# Patient Record
Sex: FEMALE | Race: WHITE | NOT HISPANIC OR LATINO | Employment: FULL TIME | URBAN - METROPOLITAN AREA
[De-identification: names, ages, dates, MRNs, and addresses within clinical notes are randomized per-mention and may not be internally consistent; named-entity substitution may affect disease eponyms.]

---

## 2017-01-06 ENCOUNTER — ONCOLOGY VISIT (OUTPATIENT)
Dept: ONCOLOGY | Facility: CLINIC | Age: 47
End: 2017-01-06
Attending: INTERNAL MEDICINE
Payer: COMMERCIAL

## 2017-01-06 VITALS
BODY MASS INDEX: 26.58 KG/M2 | OXYGEN SATURATION: 98 % | WEIGHT: 150 LBS | RESPIRATION RATE: 16 BRPM | SYSTOLIC BLOOD PRESSURE: 111 MMHG | HEART RATE: 65 BPM | DIASTOLIC BLOOD PRESSURE: 75 MMHG | TEMPERATURE: 98 F | HEIGHT: 63 IN

## 2017-01-06 DIAGNOSIS — C50.911 MALIGNANT NEOPLASM OF RIGHT FEMALE BREAST, UNSPECIFIED SITE OF BREAST: Primary | ICD-10-CM

## 2017-01-06 PROCEDURE — 99214 OFFICE O/P EST MOD 30 MIN: CPT | Performed by: INTERNAL MEDICINE

## 2017-01-06 PROCEDURE — 99211 OFF/OP EST MAY X REQ PHY/QHP: CPT

## 2017-01-06 ASSESSMENT — PAIN SCALES - GENERAL: PAINLEVEL: NO PAIN (0)

## 2017-01-06 NOTE — PROGRESS NOTES
"Malu Benavides is a 46 year old female who presents for:  Chief Complaint   Patient presents with     Oncology Clinic Visit     return breast CA F/U        Initial Vitals:  There were no vitals taken for this visit. Estimated body mass index is 26.76 kg/(m^2) as calculated from the following:    Height as of 4/11/16: 1.6 m (5' 3\").    Weight as of 10/7/16: 68.493 kg (151 lb).. There is no height or weight on file to calculate BSA. BP completed using cuff size: regular  Data Unavailable No LMP recorded. Patient is not currently having periods (Reason: UNKNOWN). Allergies and medications reviewed.     Medications: Medication refills not needed today.  Pharmacy name entered into [employer]:    [facility] [address] [place], [place] - [address] [facility] [place] [place] - [address] AT PORTAL TO REGISTERED [facility] SITES  [facility] [address] [place], [place] - [address] [place], [place] - [address]    Comments: none    8 minutes for nursing intake (face to face time)   Ana Griffith MA    DISCHARGE PLAN:Patient to scheduled  for breast MRI and follow up with Dr. Aguilar in 6 months    Next appointments: See patient instruction section  Departure Mode: Ambulatory  Accompanied by: self  5 minutes for nursing discharge (face to face time)   Purvi Neil RN        "

## 2017-01-06 NOTE — PROGRESS NOTES
[facility]    Hematology/Oncology Established Patient Follow-up Note      Today's Date: 01/06/2017    Reason for Follow-up:  right breast [id number] ER-/AK-, Her 2 + IDC, s/p lumpectomy s/p adjuvant TCHP x 5, completed radiation on 7/28/15, completed 1 year of Herceptin.    HISTORY OF PRESENT ILLNESS: Malu Benavides is a 46 year old female who presents for follow-up for her breast cancer.  She was previously a patient of Dr. Long, then Dr. Sterling.  She presented at age 44, her routine mammogram demonstrated an abnormality in the right breast. Biopsy done 12/17/2014 showed grade 3 infiltrating ductal adenocarcinoma. Estrogen and progesterone receptors were negative. HER-2 was positive by FISH with a ratio of 8.5 and staging evaluation for metastatic disease was negative. She experienced a right lumpectomy and sentinel node biopsy on 01/15/2015 demonstrating that the tumor measured 1.2 x 0.8 cm. It was ER/AK negative and HER-2 positive. The solitary sentinel node was negative for metastatic disease. The tumor was therefore staged as pT1c N0 M0, HER-2 positive breast cancer.   She did not have breast complaints on presentation.     Dr. Long recommend TCHP in adjuvant setting with Taxotere, carboplatin, Herceptin and pertuzumab. She had C1-C5 from 01/29/2015 to 4/24/2015. C6 chemo is d/c by Dr. Long due to severe neuropathy. She is advised on continue Herceptin to finish total 1 yr duration of Tx.     She completed radiation 6/5/15-7/28/15.    She completed 1 year of Herceptin in January 2016.    She underwent MISSY/BSO on 4/11/16.      INTERIM HISTORY:  Malu is here for her follow-up today.  She says that she is feeling well.  She notes that she weaned herself off of her anxiety medications recently.  She says that she feels okay so far.  She denies new breast lumps/bumps.  She notes having headache, and has chronic migraines and is seeing her neurologist at the end of this month.  She still  has chronic diarrhea off and on, but has not changed.        REVIEW OF SYSTEMS:   14 point ROS was reviewed and is negative other than as noted above in HPI.     HOME MEDICATIONS:  Current Outpatient Prescriptions   Medication Sig Dispense Refill     PREDNISONE PO Take by mouth daily       ACYCLOVIR PO Take 400 mg by mouth 5 times daily As needed for cold sores       SUMAtriptan Succinate (IMITREX PO) Take 100 mg by mouth daily as needed for migraine (100 mg at onset of migraine and MRx1 prn)       cyanocobalamin 1000 MCG SUBL Place 1,000 mcg under the tongue every 7 days       Acetaminophen (TYLENOL PO) Take 1,000 mg by mouth 2 times daily as needed for mild pain or fever       Topiramate (TOPAMAX PO) Take 100 mg by mouth At Bedtime       Cholecalciferol (VITAMIN D3 PO) Take 5,000 Units by mouth daily        propranolol (INDERAL) 40 MG tablet Take 40 mg by mouth 2 times daily        ibuprofen (ADVIL,MOTRIN) 600 MG tablet Take 1 tablet (600 mg) by mouth every 6 hours as needed for moderate pain 120 tablet      senna-docusate (SENOKOT-S;PERICOLACE) 8.6-50 MG per tablet Take 1-2 tablets by mouth 2 times daily 100 tablet      oxyCODONE (ROXICODONE) 5 MG immediate release tablet Take 1-2 tablets (5-10 mg) by mouth every 3 hours as needed for moderate to severe pain 30 tablet 0     Sertraline HCl (ZOLOFT PO) Take 150 mg by mouth daily            ALLERGIES:  Allergies   Allergen Reactions     No Known Allergies          PAST MEDICAL HISTORY:  Past Medical History   Diagnosis Date     Supervision of other normal pregnancy       - incomplete AB with suction curretage, C section for failure to progress     Chronic mixed headache syndrome      chronic daily headache and migraine     Recurrent herpes labialis      labialis     IBS (irritable bowel syndrome)      diarrhea predominate     S/p small bowel obstruction      small bowel obstruction following appy, treated nonsurgically     Papanicolaou smear of cervix  with high grade squamous intraepithelial lesion (HGSIL) 1/2005     LEEP     Former smoker      quit 2005, 8 pack year history     Adjustment disorder with mixed anxiety and depressed mood 2009     anxiety initially with secondary depressive sx 2013     Dysthymic disorder 2013     Persistent anhedonia and fatigue     Tubulovillous adenoma of rectum 8/2013     2 cm tubullvillous adenoma with no dysplasia     Pregnancy induced hypertension      pregnancy induced hypertension     Cervical cancer (H)      Breast cancer (H) 1/27/2015     Noninfectious ileitis          PAST SURGICAL HISTORY:  Past Surgical History   Procedure Laterality Date     C appendectomy  1993     Surgical history of -   2004     suction curretage     Hc mri brain w/o contrast  11/2004     paranasal sinus mucosal thickening, normal MRI brain       Hc colp cervix/upper vagina w loop elec bx cervix  1/2005     C/section, low transverse  1/2007     low segment transverse C section, extensive lysis of adhesions and repair of serosal bowel injuries     Surgical history of -   1/2007     Primary repair of multiple small bowel/sigmord colon serosal tears.     Ct scan abdomen/pelvis  8/2010     5-6 mm cyst caudate lobe of the liver, anteroverted uterus, 2.5x2.2 cm left adexal cyst     Overnight oximetry study  2/2013     event index 1.9/hr, average O2 sat 96%, 16 sec with O2 sat < 88%, stable heart rate     Colonoscopy  8/26/2013     2 mm polyp distal transverse colon(benign mucosa), 20 mm polyp recto-sigmoid colon(tubulovillous adenoma), repeat 3-5 years     C exploratory of abdomen  1/2006     laparoscopy, mini laparotomy for drainage ovarian cyst, colonic adhesions     Biopsy breast seed localization Right 1/14/2015     Procedure: BIOPSY BREAST SEED LOCALIZATION;  Surgeon: Brant Townsend MD;  Location: [facility]     Breast surgery       Hysterectomy total abdominal, bilateral salpingo-oophorectomy, combined N/A 4/11/2016     Procedure: COMBINED  HYSTERECTOMY TOTAL ABDOMINAL, SALPINGO-OOPHORECTOMY;  Surgeon: Isamar Garza MD;  Location:  OR         SOCIAL HISTORY:  Social History     Social History     Marital Status:      Spouse Name: Mihai     Number of Children: 1     Years of Education: 14     Occupational History      [employer]     [employer]     Social History Main Topics     Smoking status: Current Some Day Smoker -- 0.50 packs/day for 15 years     Types: Cigarettes     Last Attempt to Quit: 01/01/2013     Smokeless tobacco: Never Used     Alcohol Use: Yes      Comment: 2-4 beers per week     Drug Use: No     Sexual Activity:     Partners: Male      Comment: same relationship since 1988     Other Topics Concern      Service No     Blood Transfusions No     Caffeine Concern Yes     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  Has two dogs.         FAMILY HISTORY:  Family History   Problem Relation Age of Onset     Kidney Cancer Father 59     renal cell carcinoma     OSTEOPOROSIS Father      related to cancer     Connective Tissue Disorder Mother      rheumatoid arthritis     Chronic Obstructive Pulmonary Disease Mother      COPD, smoker     Cardiovascular Mother      carotid endarterectomy,peripheral vascular disease, [place] amputation, smoker     Hypertension Mother      OSTEOPOROSIS Mother      prednisone, no fractures     Neurologic Disorder Sister      headaches     Hypertension Brother      Hypertension Maternal Grandfather      CEREBROVASCULAR DISEASE Paternal Grandfather      Breast Cancer Cousin 51     two maternal cousins, diagnosed at 51 and 52     Cervical Cancer  "Maternal Grandmother 30      at 56         PHYSICAL EXAM:  Vital signs:  /75 mmHg  Pulse 65  Temp(Src) 98  F (36.7  C) (Oral)  Resp 16  Ht 1.6 m (5' 3\")  Wt 68.04 kg (150 lb)  BMI 26.58 kg/m2  SpO2 98%   ECO  GENERAL/CONSTITUTIONAL: No acute distress.  EYES: No scleral icterus.  LYMPH: No anterior cervical, posterior cervical, supraclavicular, or axillary adenopathy.   RESPIRATORY: Clear to auscultation bilaterally. No crackles or wheezing.   CARDIOVASCULAR: Regular rate and rhythm without murmurs, gallops, or rubs.  GASTROINTESTINAL: No tenderness. The patient has normal bowel sounds. No guarding.  No distention.  BREAST: Right-s/p right lumpectomy; Left-no palpable mass, discharge, rash, or axillary lymphadenopathy.   MUSCULOSKELETAL: Warm and well-perfused, no cyanosis, clubbing, or edema.  NEUROLOGIC: Alert, oriented, answers questions appropriately.  INTEGUMENTARY: No rashes or jaundice.  GAIT: Steady, does not use assistive device      LABS:  None.      IMAGING:  MRI breasts 6/3/16:  FINDINGS:       Right Breast: There is moderate background parenchymal enhancement.  The patient has had a prior lumpectomy.     There are no areas of suspicious enhancement or lymphadenopathy.     Left Breast: There is mild background parenchymal enhancement.     There are no areas of suspicious enhancement or lymphadenopathy.                                                                       IMPRESSION:    BI-RADS 2, BENIGN. No MRI evidence of malignancy.    Mammogram 16:  IMPRESSION: BI-RADS CATEGORY: 2 - Benign.      ASSESSMENT/PLAN:  Malu Benavides is a 46 year old female with:    1. Right breast [id number] ER/AR-, Her 2 + IDC, s/p lumpectomy, adjuvant TCHP x 5. C6 Taxotere is not delivered due to neuropathy. She has completed 1 year of Herceptin.  She had ER/AR - negative disease, so she would not benefit from oral antihormone therapy.     She did see genetic counseling due to young age at " diagnosis - showed a variant of uncertain significance in the BRCA2 gene.  The significance of this is unknown with regards to her breast cancer recurrence risk and ovarian cancer risk.  She did have a baseline transvaginal ultrasound and CA-125 test that were negative.  She has no family history of ovarian cancer, but does have 2 maternal cousins with breast cancer.  She is concerned about ovarian cancer, and has seen her gynecologist about pursuing an oopherectomy.  I discussed the case at our breast cancer tumor conference, and consensus was to undergo surveillance like high-risk breast cancer patient, with MRI breasts and mammograms alternating every 6 months.  She strongly desired to have her ovaries removed, in light of her indeterminate BRCA and history of breast cancer.    Ultimately, she underwent MISSY/BSO on 4/11/16.    Mammogram on 12/2/16 was benign.    -MRI breasts in June 2017  -RTC in 6 months for follow-up    2. Neuropathy: improved. She has tapered off the gabapentin.     3. Pulmonary nodule on CT 12/2014. She is a former smoker, quit 2/2015. Repeat CT chest in July 2015 shows no evidence for metastatic disease.  The tiny nodule is thought most likely secondary to prominent vascular structure.    4. She had her screening colonoscopy on 12/21/15, which showed a colon polyp (tubular adenoma) and hemorrhoids.  Her next colonoscopy will be in 3 years from then.    5) Migraines:  -she follows with her neurologist      I spent a total of 25 minutes with the patient, with over >50% of the time in counseling and/or coordination of care.      Brigitte Aguilar MD  Hematology/Oncology  [facility]

## 2017-01-06 NOTE — Clinical Note
"January 6, 2017      RE: Malu Benavides  [address] [address]      Malu Benavides is a 46 year old female who presents for:  Chief Complaint   Patient presents with     Oncology Clinic Visit     return breast CA F/U        Initial Vitals:  There were no vitals taken for this visit. Estimated body mass index is 26.76 kg/(m^2) as calculated from the following:    Height as of 4/11/16: 1.6 m (5' 3\").    Weight as of 10/7/16: 68.493 kg (151 lb).. There is no height or weight on file to calculate BSA. BP completed using cuff size: regular  Data Unavailable No LMP recorded. Patient is not currently having periods (Reason: UNKNOWN). Allergies and medications reviewed.     Medications: Medication refills not needed today.  Pharmacy name entered into [employer]:    [facility] [address] [place], [place] - [address] [facility] [place], [place] - [address] AT PORTAL TO [facility] SITES  [facility] [address] [place], [place] - [address] [facility] [place], [place] - [address]    Comments: none    8 minutes for nursing intake (face to face time)   Ana Griffith MA            [facility]    Hematology/Oncology Established Patient Follow-up Note      Today's Date: 01/06/2017    Reason for Follow-up:  right breast [id number] ER-/OK-, Her 2 + IDC, s/p lumpectomy s/p adjuvant TCHP x 5, completed radiation on 7/28/15, completed 1 year of Herceptin.    HISTORY OF PRESENT ILLNESS: Malu Benavides is a 46 year old female who presents for follow-up for her breast cancer.  She was previously a patient of Dr. Long, then Dr. Sterling.  She presented at age 44, her routine mammogram demonstrated an abnormality in the right breast. Biopsy done 12/17/2014 showed grade 3 infiltrating ductal adenocarcinoma. Estrogen and progesterone receptors were negative. HER-2 was positive by FISH with a ratio of 8.5 and staging evaluation for metastatic " disease was negative. She experienced a right lumpectomy and sentinel node biopsy on 01/15/2015 demonstrating that the tumor measured 1.2 x 0.8 cm. It was ER/CA negative and HER-2 positive. The solitary sentinel node was negative for metastatic disease. The tumor was therefore staged as pT1c N0 M0, HER-2 positive breast cancer.   She did not have breast complaints on presentation.     Dr. Long recommend TCHP in adjuvant setting with Taxotere, carboplatin, Herceptin and pertuzumab. She had C1-C5 from 01/29/2015 to 4/24/2015. C6 chemo is d/c by Dr. Long due to severe neuropathy. She is advised on continue Herceptin to finish total 1 yr duration of Tx.     She completed radiation 6/5/15-7/28/15.    She completed 1 year of Herceptin in January 2016.    She underwent MISSY/BSO on 4/11/16.      INTERIM HISTORY:  Malu is here for her follow-up today.  She says that she is feeling well.  She notes that she weaned herself off of her anxiety medications recently.  She says that she feels okay so far.  She denies new breast lumps/bumps.  She notes having headache, and has chronic migraines and is seeing her neurologist at the end of this month.  She still has chronic diarrhea off and on, but has not changed.        REVIEW OF SYSTEMS:   14 point ROS was reviewed and is negative other than as noted above in HPI.     HOME MEDICATIONS:  Current Outpatient Prescriptions   Medication Sig Dispense Refill     PREDNISONE PO Take by mouth daily       ACYCLOVIR PO Take 400 mg by mouth 5 times daily As needed for cold sores       SUMAtriptan Succinate (IMITREX PO) Take 100 mg by mouth daily as needed for migraine (100 mg at onset of migraine and MRx1 prn)       cyanocobalamin 1000 MCG SUBL Place 1,000 mcg under the tongue every 7 days       Acetaminophen (TYLENOL PO) Take 1,000 mg by mouth 2 times daily as needed for mild pain or fever       Topiramate (TOPAMAX PO) Take 100 mg by mouth At Bedtime       Cholecalciferol (VITAMIN D3 PO)  Take 5,000 Units by mouth daily        propranolol (INDERAL) 40 MG tablet Take 40 mg by mouth 2 times daily        ibuprofen (ADVIL,MOTRIN) 600 MG tablet Take 1 tablet (600 mg) by mouth every 6 hours as needed for moderate pain 120 tablet      senna-docusate (SENOKOT-S;PERICOLACE) 8.6-50 MG per tablet Take 1-2 tablets by mouth 2 times daily 100 tablet      oxyCODONE (ROXICODONE) 5 MG immediate release tablet Take 1-2 tablets (5-10 mg) by mouth every 3 hours as needed for moderate to severe pain 30 tablet 0     Sertraline HCl (ZOLOFT PO) Take 150 mg by mouth daily            ALLERGIES:  Allergies   Allergen Reactions     No Known Allergies          PAST MEDICAL HISTORY:  Past Medical History   Diagnosis Date     Supervision of other normal pregnancy       - incomplete AB with suction curretage, C section for failure to progress     Chronic mixed headache syndrome      chronic daily headache and migraine     Recurrent herpes labialis      labialis     IBS (irritable bowel syndrome)      diarrhea predominate     S/p small bowel obstruction      small bowel obstruction following appy, treated nonsurgically     Papanicolaou smear of cervix with high grade squamous intraepithelial lesion (HGSIL) 2005     LEEP     Former smoker      quit 2005, 8 pack year history     Adjustment disorder with mixed anxiety and depressed mood      anxiety initially with secondary depressive sx      Dysthymic disorder      Persistent anhedonia and fatigue     Tubulovillous adenoma of rectum 2013     2 cm tubullvillous adenoma with no dysplasia     Pregnancy induced hypertension      pregnancy induced hypertension     Cervical cancer (H)      Breast cancer (H) 2015     Noninfectious ileitis          PAST SURGICAL HISTORY:  Past Surgical History   Procedure Laterality Date     C appendectomy       Surgical history of -        suction curretage      mri brain w/o contrast  2004     paranasal sinus  mucosal thickening, normal MRI brain       Hc colp cervix/upper vagina w loop elec bx cervix  1/2005     C/section, low transverse  1/2007     low segment transverse C section, extensive lysis of adhesions and repair of serosal bowel injuries     Surgical history of -   1/2007     Primary repair of multiple small bowel/sigmord colon serosal tears.     Ct scan abdomen/pelvis  8/2010     5-6 mm cyst caudate lobe of the liver, anteroverted uterus, 2.5x2.2 cm left adexal cyst     Overnight oximetry study  2/2013     event index 1.9/hr, average O2 sat 96%, 16 sec with O2 sat < 88%, stable heart rate     Colonoscopy  8/26/2013     2 mm polyp distal transverse colon(benign mucosa), 20 mm polyp recto-sigmoid colon(tubulovillous adenoma), repeat 3-5 years     C exploratory of abdomen  1/2006     laparoscopy, mini laparotomy for drainage ovarian cyst, colonic adhesions     Biopsy breast seed localization Right 1/14/2015     Procedure: BIOPSY BREAST SEED LOCALIZATION;  Surgeon: Brant Townsend MD;  Location: [facility]     Breast surgery       Hysterectomy total abdominal, bilateral salpingo-oophorectomy, combined N/A 4/11/2016     Procedure: COMBINED HYSTERECTOMY TOTAL ABDOMINAL, SALPINGO-OOPHORECTOMY;  Surgeon: Isamar Garza MD;  Location:  OR         SOCIAL HISTORY:  Social History     Social History     Marital Status:      Spouse Name: Mihai     Number of Children: 1     Years of Education: 14     Occupational History      [employer]     Social History Main Topics     Smoking status: Current Some Day Smoker -- 0.50 packs/day for 15 years     Types: Cigarettes     Last Attempt to Quit: 01/01/2013     Smokeless tobacco: Never Used     Alcohol Use: Yes      Comment: 2-4 beers per week     Drug Use: No     Sexual Activity:     Partners: Male      Comment: same relationship since 1988     Other Topics Concern      Service No     Blood Transfusions No     Caffeine  "Concern Yes     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  Has two dogs.         FAMILY HISTORY:  Family History   Problem Relation Age of Onset     Kidney Cancer Father 59     renal cell carcinoma     OSTEOPOROSIS Father      related to cancer     Connective Tissue Disorder Mother      rheumatoid arthritis     Chronic Obstructive Pulmonary Disease Mother      COPD, smoker     Cardiovascular Mother      carotid endarterectomy,peripheral vascular disease, [place] amputation, smoker     Hypertension Mother      OSTEOPOROSIS Mother      prednisone, no fractures     Neurologic Disorder Sister      headaches     Hypertension Brother      Hypertension Maternal Grandfather      CEREBROVASCULAR DISEASE Paternal Grandfather      Breast Cancer Cousin 51     two maternal cousins, diagnosed at 51 and 52     Cervical Cancer Maternal Grandmother 30      at 56         PHYSICAL EXAM:  Vital signs:  /75 mmHg  Pulse 65  Temp(Src) 98  F (36.7  C) (Oral)  Resp 16  Ht 1.6 m (5' 3\")  Wt 68.04 kg (150 lb)  BMI 26.58 kg/m2  SpO2 98%   ECO  GENERAL/CONSTITUTIONAL: No acute distress.  EYES: No scleral icterus.  LYMPH: No anterior cervical, posterior cervical, supraclavicular, or axillary adenopathy.   RESPIRATORY: Clear to auscultation bilaterally. No crackles or wheezing.   CARDIOVASCULAR: Regular rate and rhythm without murmurs, gallops, or rubs.  GASTROINTESTINAL: No tenderness. The patient has normal bowel sounds. No guarding.  No distention.  BREAST: Right-s/p right lumpectomy; Left-no palpable mass, discharge, rash, or axillary lymphadenopathy.   MUSCULOSKELETAL: Warm and well-perfused, " no cyanosis, clubbing, or edema.  NEUROLOGIC: Alert, oriented, answers questions appropriately.  INTEGUMENTARY: No rashes or jaundice.  GAIT: Steady, does not use assistive device      LABS:  None.      IMAGING:  MRI breasts 6/3/16:  FINDINGS:       Right Breast: There is moderate background parenchymal enhancement.  The patient has had a prior lumpectomy.     There are no areas of suspicious enhancement or lymphadenopathy.     Left Breast: There is mild background parenchymal enhancement.     There are no areas of suspicious enhancement or lymphadenopathy.                                                                       IMPRESSION:    BI-RADS 2, BENIGN. No MRI evidence of malignancy.    Mammogram 12/2/16:  IMPRESSION: BI-RADS CATEGORY: 2 - Benign.      ASSESSMENT/PLAN:  Malu Benavides is a 46 year old female with:    1. Right breast [id number] ER/GA-, Her 2 + IDC, s/p lumpectomy, adjuvant TCHP x 5. C6 Taxotere is not delivered due to neuropathy. She has completed 1 year of Herceptin.  She had ER/GA - negative disease, so she would not benefit from oral antihormone therapy.     She did see genetic counseling due to young age at diagnosis - showed a variant of uncertain significance in the BRCA2 gene.  The significance of this is unknown with regards to her breast cancer recurrence risk and ovarian cancer risk.  She did have a baseline transvaginal ultrasound and CA-125 test that were negative.  She has no family history of ovarian cancer, but does have 2 maternal cousins with breast cancer.  She is concerned about ovarian cancer, and has seen her gynecologist about pursuing an oopherectomy.  I discussed the case at our breast cancer tumor conference, and consensus was to undergo surveillance like high-risk breast cancer patient, with MRI breasts and mammograms alternating every 6 months.  She strongly desired to have her ovaries removed, in light of her indeterminate BRCA and history of breast cancer.     Ultimately, she underwent MISSY/BSO on 4/11/16.    Mammogram on 12/2/16 was benign.    -MRI breasts in June 2017  -RTC in 6 months for follow-up    2. Neuropathy: improved. She has tapered off the gabapentin.     3. Pulmonary nodule on CT 12/2014. She is a former smoker, quit 2/2015. Repeat CT chest in July 2015 shows no evidence for metastatic disease.  The tiny nodule is thought most likely secondary to prominent vascular structure.    4. She had her screening colonoscopy on 12/21/15, which showed a colon polyp (tubular adenoma) and hemorrhoids.  Her next colonoscopy will be in 3 years from then.    5) Migraines:  -she follows with her neurologist      I spent a total of 25 minutes with the patient, with over >50% of the time in counseling and/or coordination of care.      Brigitte Aguilar MD  Hematology/Oncology  [facility]          Brigitte Aguilar MD

## 2017-01-06 NOTE — MR AVS SNAPSHOT
After Visit Summary   1/6/2017    Malu Benavides    MRN: [id number]           Patient Information     Date Of Birth          1970        Visit Information        Provider Department      1/6/2017 12:30 PM Brigitte Aguilar MD [facility]        Today's Diagnoses     Malignant neoplasm of right female breast, unspecified site of breast (H)    -  1       Care Instructions    -Schedule MRI breasts in June 2017  -return to clinic in 6 months        Follow-ups after your visit        Your next 10 appointments already scheduled     Jun 02, 2017 11:00 AM   MR BREAST BILATERAL W/O & W CONTRAST with [id number]   [facility] ([facility])    [address] [place] [id number] [phone]           Take your medicines as usual, unless your doctor tells you not to. Bring a list of your current medicines to your exam (including vitamins, minerals and over-the-counter drugs).  The timing of your exam may depend on the start of your last period. If you re in menopause, you may have the exam anytime.  Please bring any previous mammograms or breast MRIs from other facilities to the MRI dept. Do not mail these items to us.  You will have contrast for this exam. To prepare:   The day before your exam, drink extra fluids at least six 8-ounce glasses (unless your doctor tells you to restrict your fluids).   Have a blood test (creatinine test) within 30 days of your exam. Go to your clinic or Diagnostic Imaging Department for this test.  The MRI machine uses a strong magnet. Please wear clothes without metal (snaps, zippers). A sweatsuit works well, or we may give you a hospital gown.  Please remove any body piercings and hair extensions before you arrive. You will also remove watches, jewelry, hairpins, wallets, dentures, partial dental plates and hearing aids. You may wear contact lenses, and you may be able to wear your rings. We have a safe place to keep  your personal items, but it is safer to leave them at home.   **IMPORTANT** THE INSTRUCTIONS BELOW ARE ONLY FOR THOSE PATIENTS WHO HAVE BEEN TOLD THEY WILL RECEIVE SEDATION OR GENERAL ANESTHESIA DURING THEIR MRI PROCEDURE:  IF YOU WILL RECEIVE SEDATION (take medicine to help you relax during your exam)   You must get the medicine from your doctor before you arrive. Bring the medicine to the exam. Do not take it at home.   Arrive one hour early. Bring someone who can take you home after the test. Your medicine will make you sleepy. After the exam, you may not drive, take a bus or take a taxi by yourself.   No eating 8 hours before your exam. You may have clear liquids up until 4 hours before your exam. (Clear liquids include water, clear tea, black coffee and fruit juice without pulp.)  IF YOU WILL RECEIVE ANESTHESIA (be asleep for your exam)   Arrive 1 1/2 hours early. Bring someone who can take you home after the test. You may not drive, take a bus or take a taxi by yourself.   No eating 8 hours before your exam. You may have clear liquids up until 4 hours before your exam. (Clear liquids include water, clear tea, black coffee and fruit juice without pulp.)  If you have any questions, please contact your Imaging Department exam site.            Jul 21, 2017 12:30 PM   Return Visit with Brigitte Aguilar MD   [facility] ([facility])    [facility]  [address]  [facility] [address]   [phone]              Future tests that were ordered for you today     Open Future Orders        Priority Expected Expires Ordered    MR Breast Bilateral w/o & w Contrast Routine 6/6/2017 1/6/2018 1/6/2017            Who to contact     If you have questions or need follow up information about today's clinic visit or your schedule please contact [facility] directly at [phone].  Normal or non-critical lab and imaging results will be communicated to  "you by MyChart, letter or phone within 4 business days after the clinic has received the results. If you do not hear from us within 7 days, please contact the clinic through [employer] or phone. If you have a critical or abnormal lab result, we will notify you by phone as soon as possible.  Submit refill requests through [employer] or call your pharmacy and they will forward the refill request to us. Please allow 3 business days for your refill to be completed.          Additional Information About Your Visit        [employer] Information     [employer] gives you secure access to your electronic health record. If you see a primary care provider, you can also send messages to your care team and make appointments. If you have questions, please call your primary care clinic.  If you do not have a primary care provider, please call [phone] and they will assist you.        Care EveryWhere ID     This is your Care EveryWhere ID. This could be used by other organizations to access your [facility] medical records  [id number]        Your Vitals Were     Pulse Temperature Respirations Height BMI (Body Mass Index) Pulse Oximetry    65 98  F (36.7  C) (Oral) 16 1.6 m (5' 3\") 26.58 kg/m2 98%       Blood Pressure from Last 3 Encounters:   01/06/17 111/75   10/07/16 102/63   06/10/16 109/66    Weight from Last 3 Encounters:   01/06/17 68.04 kg (150 lb)   10/07/16 68.493 kg (151 lb)   06/10/16 66.225 kg (146 lb)               Primary Care Provider Office Phone # Fax #    Rosamaria Bailey [phone]       [facility] [address] [facility] [address]        Thank you!     Thank you for choosing [facility]  for your care. Our goal is always to provide you with excellent care. Hearing back from our patients is one way we can continue to improve our services. Please take a few minutes to complete the written survey that you may receive in the mail after your visit with us. Thank you!           "   Your Updated Medication List - Protect others around you: Learn how to safely use, store and throw away your medicines at www.disposemymeds.org.          This list is accurate as of: 1/6/17  1:04 PM.  Always use your most recent med list.                   Brand Name Dispense Instructions for use    ACYCLOVIR PO      Take 400 mg by mouth 5 times daily As needed for cold sores       cyanocobalamin 1000 MCG Subl sublingual tablet      Place 1,000 mcg under the tongue every 7 days       ibuprofen 600 MG tablet    ADVIL/MOTRIN    120 tablet    Take 1 tablet (600 mg) by mouth every 6 hours as needed for moderate pain       IMITREX PO      Take 100 mg by mouth daily as needed for migraine (100 mg at onset of migraine and MRx1 prn)       oxyCODONE 5 MG IR tablet    ROXICODONE    30 tablet    Take 1-2 tablets (5-10 mg) by mouth every 3 hours as needed for moderate to severe pain       PREDNISONE PO      Take by mouth daily       propranolol 40 MG tablet    INDERAL     Take 40 mg by mouth 2 times daily       senna-docusate 8.6-50 MG per tablet    SENOKOT-S;PERICOLACE    100 tablet    Take 1-2 tablets by mouth 2 times daily       TOPAMAX PO      Take 100 mg by mouth At Bedtime       TYLENOL PO      Take 1,000 mg by mouth 2 times daily as needed for mild pain or fever       VITAMIN D3 PO      Take 5,000 Units by mouth daily       ZOLOFT PO      Take 150 mg by mouth daily

## 2017-06-02 ENCOUNTER — HOSPITAL ENCOUNTER (OUTPATIENT)
Dept: MRI IMAGING | Facility: CLINIC | Age: 47
Discharge: HOME | End: 2017-06-02
Attending: INTERNAL MEDICINE | Admitting: INTERNAL MEDICINE
Payer: COMMERCIAL

## 2017-06-02 DIAGNOSIS — C50.911 MALIGNANT NEOPLASM OF RIGHT FEMALE BREAST, UNSPECIFIED SITE OF BREAST: ICD-10-CM

## 2017-06-02 PROCEDURE — A9585 GADOBUTROL INJECTION: HCPCS | Performed by: INTERNAL MEDICINE

## 2017-06-02 PROCEDURE — 27210995 ZZH RX 272: Performed by: INTERNAL MEDICINE

## 2017-06-02 PROCEDURE — 25000128 H RX IP 250 OP 636: Performed by: INTERNAL MEDICINE

## 2017-06-02 PROCEDURE — 0159T MR BREAST BILATERAL W/O & W CONTRAST: CPT

## 2017-06-02 RX ORDER — ACYCLOVIR 200 MG/1
60 CAPSULE ORAL ONCE
Status: COMPLETED | OUTPATIENT
Start: 2017-06-02 | End: 2017-06-02

## 2017-06-02 RX ORDER — GADOBUTROL 604.72 MG/ML
10 INJECTION INTRAVENOUS ONCE
Status: COMPLETED | OUTPATIENT
Start: 2017-06-02 | End: 2017-06-02

## 2017-06-02 RX ADMIN — GADOBUTROL 10 ML: 604.72 INJECTION INTRAVENOUS at 12:09

## 2017-06-02 RX ADMIN — SODIUM CHLORIDE 60 ML: 9 INJECTION, SOLUTION INTRAMUSCULAR; INTRAVENOUS; SUBCUTANEOUS at 12:10

## 2017-07-29 ENCOUNTER — HEALTH MAINTENANCE LETTER (OUTPATIENT)
Age: 47
End: 2017-07-29

## 2017-09-15 ENCOUNTER — ONCOLOGY VISIT (OUTPATIENT)
Dept: ONCOLOGY | Facility: CLINIC | Age: 47
End: 2017-09-15
Attending: INTERNAL MEDICINE
Payer: COMMERCIAL

## 2017-09-15 VITALS
HEART RATE: 67 BPM | DIASTOLIC BLOOD PRESSURE: 67 MMHG | SYSTOLIC BLOOD PRESSURE: 106 MMHG | OXYGEN SATURATION: 100 % | TEMPERATURE: 98.3 F | BODY MASS INDEX: 26.57 KG/M2 | WEIGHT: 150 LBS | RESPIRATION RATE: 14 BRPM

## 2017-09-15 DIAGNOSIS — Z17.0 MALIGNANT NEOPLASM OF RIGHT BREAST IN FEMALE, ESTROGEN RECEPTOR POSITIVE, UNSPECIFIED SITE OF BREAST (H): Primary | ICD-10-CM

## 2017-09-15 DIAGNOSIS — C50.911 MALIGNANT NEOPLASM OF RIGHT BREAST IN FEMALE, ESTROGEN RECEPTOR POSITIVE, UNSPECIFIED SITE OF BREAST (H): Primary | ICD-10-CM

## 2017-09-15 PROCEDURE — 99214 OFFICE O/P EST MOD 30 MIN: CPT | Performed by: INTERNAL MEDICINE

## 2017-09-15 PROCEDURE — 99211 OFF/OP EST MAY X REQ PHY/QHP: CPT

## 2017-09-15 ASSESSMENT — PAIN SCALES - GENERAL: PAINLEVEL: NO PAIN (0)

## 2017-09-15 NOTE — PROGRESS NOTES
"Oncology Rooming Note    September 15, 2017 1:22 PM   Malu Benavides is a 47 year old female who presents for:    Chief Complaint   Patient presents with     Oncology Clinic Visit     Initial Vitals: /67 (BP Location: Right arm, Patient Position: Chair, Cuff Size: Adult Regular)  Pulse 67  Temp 98.3  F (36.8  C) (Oral)  Resp 14  Wt 68 kg (150 lb)  SpO2 100%  BMI 26.57 kg/m2 Estimated body mass index is 26.57 kg/(m^2) as calculated from the following:    Height as of 1/6/17: 1.6 m (5' 3\").    Weight as of this encounter: 68 kg (150 lb). Body surface area is 1.74 meters squared.  No Pain (0) Comment: Data Unavailable   No LMP recorded. Patient is not currently having periods (Reason: UNKNOWN).  Allergies reviewed: Yes  Medications reviewed: Yes    Medications: Medication refills not needed today.  Pharmacy name entered into [facility]:    [facility] [address] [address] [facility] [place] - [address] [facility] - [place], [place] - [address] AT PORTAL TO REG[facility] SITES  [facility] [address] [address] [facility] [place], [place] - [address] [facility] [place], [place] - [address]    Clinical concerns: None     5 minutes for nursing intake (face to face time)     [facility]              "

## 2017-09-15 NOTE — MR AVS SNAPSHOT
"              After Visit Summary   9/15/2017    Malu Benavides    MRN: [id number]           Patient Information     Date Of Birth          1970        Visit Information        Provider Department      9/15/2017 1:30 PM Brigitte Aguilar MD [facility]        Today's Diagnoses     Malignant neoplasm of right breast in female, estrogen receptor positive, unspecified site of breast (H)    -  1      Care Instructions    -schedule mammogram in December 2017  -return to clinic in 6 months          Follow-ups after your visit        Your next 10 appointments already scheduled     Dec 08, 2017 10:30 AM STELLA BERKOWITZ SCREENING DIGITAL BILATERAL with SHBCMA2   [facility] ([facility])    [address] [place] [address], [address]  [facility] [address] [phone]           Do not use any powder, lotion or deodorant under your arms or on your breast. If you do, we will ask you to remove it before your exam.  Wear comfortable, two-piece clothing.  If you have any allergies, tell your care team.  Bring any previous mammograms from other facilities or have them mailed to the breast center. Three-dimensional (3D) mammograms are available at [facility] locations in [facility], [place], [place], [facility], [place], [place], and [place]. [facility] locations include [place] and [facility] in [place]. Benefits of 3D mammograms include: - Improved rate of cancer detection - Decreases your chance of having to go back for more tests, which means fewer: - \"False-positive\" results (This means that there is an abnormal area but it isn't cancer.) - Invasive testing procedures, such as a biopsy or surgery - Can provide clearer images of the breast if you have dense breast tissue. 3D mammography is an optional exam that anyone can have with a 2D mammogram. It doesn't replace or take the place of a 2D mammogram. 2D mammograms remain an effective screening " test for all women.  Not all insurance companies cover the cost of a 3D mammogram. Check with your insurance.              Future tests that were ordered for you today     Open Future Orders        Priority Expected Expires Ordered    *MA Screening Digital Bilateral Routine 12/3/2017 9/15/2018 9/15/2017            Who to contact     If you have questions or need follow up information about today's clinic visit or your schedule please contact [facility] directly at [phone].  Normal or non-critical lab and imaging results will be communicated to you by [employer], letter or phone within 4 business days after the clinic has received the results. If you do not hear from us within 7 days, please contact the clinic through [employer]sm[employer] or phone. If you have a critical or abnormal lab result, we will notify you by phone as soon as possible.  Submit refill requests through [employer]sm[employer] or call your pharmacy and they will forward the refill request to us. Please allow 3 business days for your refill to be completed.          Additional Information About Your Visit        Bsmark Information     [employer] gives you secure access to your electronic health record. If you see a primary care provider, you can also send messages to your care team and make appointments. If you have questions, please call your primary care clinic.  If you do not have a primary care provider, please call [phone] and they will assist you.        Care EveryWhere ID     This is your Care EveryWhere ID. This could be used by other organizations to access your [facility] medical records  [id number]        Your Vitals Were     Pulse Temperature Respirations Pulse Oximetry BMI (Body Mass Index)       67 98.3  F (36.8  C) (Oral) 14 100% 26.57 kg/m2        Blood Pressure from Last 3 Encounters:   09/15/17 106/67   01/06/17 111/75   10/07/16 102/63    Weight from Last 3 Encounters:   09/15/17 68 kg (150 lb)   01/06/17 68 kg (150 lb)   10/07/16 68.5 kg  (151 lb)                 Today's Medication Changes          These changes are accurate as of: 9/15/17  1:56 PM.  If you have any questions, ask your nurse or doctor.               Stop taking these medicines if you haven't already. Please contact your care team if you have questions.     oxyCODONE 5 MG IR tablet   Commonly known as:  ROXICODONE           ZOLOFT PO                    Primary Care Provider    None       No address on file        Equal Access to Services     [facility] : Hadii esthela ku hadtoritoo Sosauravali, waaxda luqadaha, qaybta kaalmada ademarbellayada, waxay kiarain hayjohnn miguelmarbella kunz harshad . So [facility] [phone].    ATENCIÓN: Si rosyla shani, tiene a maharaj disposición servicios gratuitos de asistencia lingüística. [facility] [phone].    We comply with applicable federal civil rights laws and [place] laws. We do not discriminate on the basis of race, color, national origin, age, disability sex, sexual orientation or gender identity.            Thank you!     Thank you for choosing [facility]  for your care. Our goal is always to provide you with excellent care. Hearing back from our patients is one way we can continue to improve our services. Please take a few minutes to complete the written survey that you may receive in the mail after your visit with us. Thank you!             Your Updated Medication List - Protect others around you: Learn how to safely use, store and throw away your medicines at www.disposemymeds.org.          This list is accurate as of: 9/15/17  1:56 PM.  Always use your most recent med list.                   Brand Name Dispense Instructions for use Diagnosis    ACYCLOVIR PO      Take 400 mg by mouth 5 times daily As needed for cold sores        cyanocobalamin 1000 MCG Subl sublingual tablet      Place 1,000 mcg under the tongue every 7 days        FLUOXETINE HCL PO      Take by mouth daily        ibuprofen 600 MG tablet    ADVIL/MOTRIN    120 tablet    Take 1 tablet  (600 mg) by mouth every 6 hours as needed for moderate pain    S/P hysterectomy       IMITREX PO      Take 100 mg by mouth daily as needed for migraine (100 mg at onset of migraine and MRx1 prn)        propranolol 40 MG tablet    INDERAL     Take 40 mg by mouth 2 times daily    Chronic mixed headache syndrome       senna-docusate 8.6-50 MG per tablet    SENOKOT-S;PERICOLACE    100 tablet    Take 1-2 tablets by mouth 2 times daily    S/P hysterectomy       TOPAMAX PO      Take 100 mg by mouth At Bedtime        TYLENOL PO      Take 1,000 mg by mouth 2 times daily as needed for mild pain or fever        VITAMIN D3 PO      Take 5,000 Units by mouth daily

## 2017-09-15 NOTE — PROGRESS NOTES
[facility]    Hematology/Oncology Established Patient Follow-up Note      Today's Date: 09/15/2017    Reason for Follow-up:  right breast [id number] ER-/RI-, Her 2 + IDC, s/p lumpectomy s/p adjuvant TCHP x 5, completed radiation on 7/28/15, completed 1 year of Herceptin.    HISTORY OF PRESENT ILLNESS: Malu Benavides is a 47 year old female who presents for follow-up for her breast cancer.  She was previously a patient of Dr. Long, then Dr. Sterling.  She presented at age 44, her routine mammogram demonstrated an abnormality in the right breast. Biopsy done 12/17/2014 showed grade 3 infiltrating ductal adenocarcinoma. Estrogen and progesterone receptors were negative. HER-2 was positive by FISH with a ratio of 8.5 and staging evaluation for metastatic disease was negative. She experienced a right lumpectomy and sentinel node biopsy on 01/15/2015 demonstrating that the tumor measured 1.2 x 0.8 cm. It was ER/RI negative and HER-2 positive. The solitary sentinel node was negative for metastatic disease. The tumor was therefore staged as pT1c N0 M0, HER-2 positive breast cancer.   She did not have breast complaints on presentation.     Dr. Long recommend TCHP in adjuvant setting with Taxotere, carboplatin, Herceptin and pertuzumab. She had C1-C5 from 01/29/2015 to 4/24/2015. C6 chemo is d/c by Dr. Long due to severe neuropathy. She is advised on continue Herceptin to finish total 1 yr duration of Tx.     She completed radiation 6/5/15-7/28/15.    She completed 1 year of Herceptin in January 2016.    She underwent MISSY/BSO on 4/11/16.      INTERIM HISTORY:  Malu is here for her follow-up today.  She says that she is feeling well.  She has weaned herself off of gabapentin and her neuropathy is much better now.  She denies any other complaints.  She denies new breast lumps/bumps.      REVIEW OF SYSTEMS:   14 point ROS was reviewed and is negative other than as noted above in HPI.     HOME  MEDICATIONS:  Current Outpatient Prescriptions   Medication Sig Dispense Refill     FLUOXETINE HCL PO Take by mouth daily       ibuprofen (ADVIL,MOTRIN) 600 MG tablet Take 1 tablet (600 mg) by mouth every 6 hours as needed for moderate pain 120 tablet      ACYCLOVIR PO Take 400 mg by mouth 5 times daily As needed for cold sores       SUMAtriptan Succinate (IMITREX PO) Take 100 mg by mouth daily as needed for migraine (100 mg at onset of migraine and MRx1 prn)       cyanocobalamin 1000 MCG SUBL Place 1,000 mcg under the tongue every 7 days       Acetaminophen (TYLENOL PO) Take 1,000 mg by mouth 2 times daily as needed for mild pain or fever       Topiramate (TOPAMAX PO) Take 100 mg by mouth At Bedtime       Cholecalciferol (VITAMIN D3 PO) Take 5,000 Units by mouth daily        propranolol (INDERAL) 40 MG tablet Take 40 mg by mouth 2 times daily        senna-docusate (SENOKOT-S;PERICOLACE) 8.6-50 MG per tablet Take 1-2 tablets by mouth 2 times daily 100 tablet          ALLERGIES:  Allergies   Allergen Reactions     No Known Allergies          PAST MEDICAL HISTORY:  Past Medical History:   Diagnosis Date     Adjustment disorder with mixed anxiety and depressed mood 2009    anxiety initially with secondary depressive sx 2013     Breast cancer (H) 1/27/2015     Cervical cancer (H)      Chronic mixed headache syndrome     chronic daily headache and migraine     Dysthymic disorder 2013    Persistent anhedonia and fatigue     Former smoker     quit 2005, 8 pack year history     IBS (irritable bowel syndrome)     diarrhea predominate     Noninfectious ileitis      Papanicolaou smear of cervix with high grade squamous intraepithelial lesion (HGSIL) 1/2005    LEEP     Pregnancy induced hypertension     pregnancy induced hypertension     Recurrent herpes labialis     labialis     S/p small bowel obstruction 1993    small bowel obstruction following appy, treated nonsurgically     Supervision of other normal pregnancy 2004      - incomplete AB with suction curretage, C section for failure to progress     Tubulovillous adenoma of rectum 2013    2 cm tubullvillous adenoma with no dysplasia         PAST SURGICAL HISTORY:  Past Surgical History:   Procedure Laterality Date     BIOPSY BREAST SEED LOCALIZATION Right 2015    Procedure: BIOPSY BREAST SEED LOCALIZATION;  Surgeon: Brant Townsend MD;  Location: [facility]     BREAST SURGERY       C APPENDECTOMY       C EXPLORATORY OF ABDOMEN  2006    laparoscopy, mini laparotomy for drainage ovarian cyst, colonic adhesions     C/SECTION, LOW TRANSVERSE  2007    low segment transverse C section, extensive lysis of adhesions and repair of serosal bowel injuries     COLONOSCOPY  2013    2 mm polyp distal transverse colon(benign mucosa), 20 mm polyp recto-sigmoid colon(tubulovillous adenoma), repeat 3-5 years     CT SCAN ABDOMEN/PELVIS  2010    5-6 mm cyst caudate lobe of the liver, anteroverted uterus, 2.5x2.2 cm left adexal cyst     HC COLP CERVIX/UPPER VAGINA W LOOP ELEC BX CERVIX  2005     HC MRI BRAIN W/O CONTRAST  2004    paranasal sinus mucosal thickening, normal MRI brain       HYSTERECTOMY TOTAL ABDOMINAL, BILATERAL SALPINGO-OOPHORECTOMY, COMBINED N/A 2016    Procedure: COMBINED HYSTERECTOMY TOTAL ABDOMINAL, SALPINGO-OOPHORECTOMY;  Surgeon: Isamar Garza MD;  Location:  OR     OVERNIGHT OXIMETRY STUDY  2013    event index 1.9/hr, average O2 sat 96%, 16 sec with O2 sat < 88%, stable heart rate     SURGICAL HISTORY OF -       suction curretage     SURGICAL HISTORY OF -   2007    Primary repair of multiple small bowel/sigmord colon serosal tears.         SOCIAL HISTORY:  Social History     Social History     Marital status:      Spouse name: Mihai     Number of children: 1     Years of education: 14     Occupational History      [employer]     Social History Main Topics     Smoking status:  Current Some Day Smoker     Packs/day: 0.50     Years: 15.00     Types: Cigarettes     Last attempt to quit: 2013     Smokeless tobacco: Never Used     Alcohol use Yes      Comment: 2-4 beers per week     Drug use: No     Sexual activity: Yes     Partners: Male      Comment: same relationship since      Other Topics Concern      Service No     Blood Transfusions No     Caffeine Concern Yes     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  Has two dogs.         FAMILY HISTORY:  Family History   Problem Relation Age of Onset     Kidney Cancer Father 59     renal cell carcinoma     OSTEOPOROSIS Father      related to cancer     Connective Tissue Disorder Mother      rheumatoid arthritis     Chronic Obstructive Pulmonary Disease Mother      COPD, smoker     Cardiovascular Mother      carotid endarterectomy,peripheral vascular disease, [place] amputation, smoker     Hypertension Mother      OSTEOPOROSIS Mother      prednisone, no fractures     Neurologic Disorder Sister      headaches     Hypertension Brother      Hypertension Maternal Grandfather      CEREBROVASCULAR DISEASE Paternal Grandfather      Breast Cancer Cousin 51     two maternal cousins, diagnosed at 51 and 52     Cervical Cancer Maternal Grandmother 30      at 56         PHYSICAL EXAM:  Vital signs:  /67 (BP Location: Right arm, Patient Position: Chair, Cuff Size: Adult Regular)  Pulse 67  Temp 98.3  F (36.8  C) (Oral)  Resp 14  Wt 68 kg (150 lb)  SpO2 100%  BMI 26.57 kg/m2   ECO  GENERAL/CONSTITUTIONAL: No acute distress.  EYES: No scleral icterus.  LYMPH: No anterior cervical, posterior  cervical, supraclavicular, or axillary adenopathy.   RESPIRATORY: Clear to auscultation bilaterally. No crackles or wheezing.   CARDIOVASCULAR: Regular rate and rhythm without murmurs, gallops, or rubs.  GASTROINTESTINAL: No tenderness. The patient has normal bowel sounds. No guarding.  No distention.  BREAST: Right-s/p right lumpectomy; Left-no palpable mass, discharge, rash, or axillary lymphadenopathy.   MUSCULOSKELETAL: Warm and well-perfused, no cyanosis, clubbing, or edema.  NEUROLOGIC: Alert, oriented, answers questions appropriately.  INTEGUMENTARY: No rashes or jaundice.  GAIT: Steady, does not use assistive device      LABS:  None.      IMAGING:  Mammogram 12/2/16:  IMPRESSION: BI-RADS CATEGORY: 2 - Benign.    MRI breast 6/2/17:     FINDINGS:      Right Breast: There is mild background parenchymal enhancement. The  patient has had a prior right lumpectomy.     There are no areas of suspicious enhancement or lymphadenopathy.     Left Breast: There is mild background parenchymal enhancement.     There are no areas of suspicious enhancement or lymphadenopathy.         IMPRESSION:   BI-RADS 2, BENIGN. No MR evidence of malignancy.         ASSESSMENT/PLAN:  Malu Benavides is a 47 year old female with:    1. Right breast [id number] ER/DE-, Her 2 + IDC, s/p lumpectomy, adjuvant TCHP x 5. C6 Taxotere is not delivered due to neuropathy. She has completed 1 year of Herceptin.  She had ER/DE - negative disease, so she would not benefit from oral antihormone therapy.     She did see genetic counseling due to young age at diagnosis - showed a variant of uncertain significance in the BRCA2 gene.  The significance of this is unknown with regards to her breast cancer recurrence risk and ovarian cancer risk.  She did have a baseline transvaginal ultrasound and CA-125 test that were negative.  She has no family history of ovarian cancer, but does have 2 maternal cousins with breast cancer.  She is concerned about ovarian  cancer, and has seen her gynecologist about pursuing an oopherectomy.  I discussed the case at our breast cancer tumor conference, and consensus was to undergo surveillance like high-risk breast cancer patient, with MRI breasts and mammograms alternating every 6 months.  She strongly desired to have her ovaries removed, in light of her indeterminate BRCA and history of breast cancer.    Ultimately, she underwent MISSY/BSO on 4/11/16.    MRI breast on 6/2/17 was benign.    -bilateral mammogram in December 2017  -RTC in 6 months for follow-up    2. Neuropathy: improved. She has tapered off the gabapentin.     3. Pulmonary nodule on CT 12/2014. She is a former smoker, quit 2/2015. Repeat CT chest in July 2015 shows no evidence for metastatic disease.  The tiny nodule is thought most likely secondary to prominent vascular structure.    4. She had her screening colonoscopy on 12/21/15, which showed a colon polyp (tubular adenoma) and hemorrhoids.  Her next colonoscopy will be in 3 years from then.    5) Migraines:  -she follows with her neurologist      I spent a total of 25 minutes with the patient, with over >50% of the time in counseling and/or coordination of care.      Brigitte Aguilar MD  Hematology/Oncology  [facility]

## 2017-09-15 NOTE — PATIENT INSTRUCTIONS
-schedule mammogram in December 2017 - done [place]  -return to clinic in 6 months  Patient will call to schedule    AVS given to patient - [place]

## 2017-12-15 ENCOUNTER — HOSPITAL ENCOUNTER (OUTPATIENT)
Dept: MAMMOGRAPHY | Facility: CLINIC | Age: 47
Discharge: HOME | End: 2017-12-15
Attending: INTERNAL MEDICINE | Admitting: INTERNAL MEDICINE
Payer: COMMERCIAL

## 2017-12-15 DIAGNOSIS — C50.911 MALIGNANT NEOPLASM OF RIGHT BREAST IN FEMALE, ESTROGEN RECEPTOR POSITIVE, UNSPECIFIED SITE OF BREAST (H): ICD-10-CM

## 2017-12-15 DIAGNOSIS — Z17.0 MALIGNANT NEOPLASM OF RIGHT BREAST IN FEMALE, ESTROGEN RECEPTOR POSITIVE, UNSPECIFIED SITE OF BREAST (H): ICD-10-CM

## 2017-12-15 PROCEDURE — 77063 BREAST TOMOSYNTHESIS BI: CPT

## 2017-12-15 PROCEDURE — G0202 SCR MAMMO BI INCL CAD: HCPCS

## 2017-12-19 ENCOUNTER — TELEPHONE (OUTPATIENT)
Dept: ONCOLOGY | Facility: CLINIC | Age: 47
End: 2017-12-19

## 2018-03-02 ENCOUNTER — ONCOLOGY VISIT (OUTPATIENT)
Dept: ONCOLOGY | Facility: CLINIC | Age: 48
End: 2018-03-02
Attending: INTERNAL MEDICINE
Payer: COMMERCIAL

## 2018-03-02 VITALS
DIASTOLIC BLOOD PRESSURE: 60 MMHG | OXYGEN SATURATION: 100 % | HEART RATE: 67 BPM | WEIGHT: 143.6 LBS | TEMPERATURE: 98 F | RESPIRATION RATE: 16 BRPM | BODY MASS INDEX: 25.44 KG/M2 | SYSTOLIC BLOOD PRESSURE: 94 MMHG

## 2018-03-02 DIAGNOSIS — C50.911 MALIGNANT NEOPLASM OF RIGHT BREAST IN FEMALE, ESTROGEN RECEPTOR NEGATIVE, UNSPECIFIED SITE OF BREAST (H): Primary | ICD-10-CM

## 2018-03-02 DIAGNOSIS — Z17.1 MALIGNANT NEOPLASM OF RIGHT BREAST IN FEMALE, ESTROGEN RECEPTOR NEGATIVE, UNSPECIFIED SITE OF BREAST (H): Primary | ICD-10-CM

## 2018-03-02 PROCEDURE — 99214 OFFICE O/P EST MOD 30 MIN: CPT | Performed by: INTERNAL MEDICINE

## 2018-03-02 PROCEDURE — G0463 HOSPITAL OUTPT CLINIC VISIT: HCPCS

## 2018-03-02 ASSESSMENT — PAIN SCALES - GENERAL: PAINLEVEL: NO PAIN (0)

## 2018-03-02 NOTE — PATIENT INSTRUCTIONS
-schedule MRI breast in early June 2018 scheduled/josé miguel  -return to clinic in 6 months  Scheduled/josé miguel    AVS printed and given to patient/José Miguel

## 2018-03-02 NOTE — MR AVS SNAPSHOT
After Visit Summary   3/2/2018    Malu Benavides    MRN: [id number]           Patient Information     Date Of Birth          1970        Visit Information        Provider Department      3/2/2018 12:30 PM Brigitte Aguilar MD [facility]        Today's Diagnoses     Malignant neoplasm of right breast in female, estrogen receptor negative, unspecified site of breast (H)    -  1      Care Instructions    -schedule MRI breast in early June 2018 scheduled/josé miguel  -return to clinic in 6 months  Scheduled/josé miguel    AVS printed and given to patient/José Miguel          Follow-ups after your visit        Your next 10 appointments already scheduled     Jun 08, 2018 12:00 PM CDT   (Arrive by 11:45 AM)   MR BREAST BILATERAL W/O & W CONTRAST with [id number]   [facility] ([facility])    [address] [place] [id number] [phone]           Take your medicines as usual, unless your doctor tells you not to. Bring a list of your current medicines to your exam (including vitamins, minerals and over-the-counter drugs).  The timing of your exam may depend on the start of your last period. If you re in menopause, you may have the exam anytime.  Please bring any previous mammograms or breast MRIs from other facilities to the MRI dept. Do not mail these items to us.   You will have IV contrast for this exam.  You do not need to do anything special to prepare.  The MRI machine uses a strong magnet. Please wear clothes without metal (snaps, zippers). A sweatsuit works well, or we may give you a hospital gown.  Please remove any body piercings and hair extensions before you arrive. You will also remove watches, jewelry, hairpins, wallets, dentures, partial dental plates and hearing aids. You may wear contact lenses, and you may be able to wear your rings. We have a safe place to keep your personal items, but it is safer to leave them at home.  **IMPORTANT** THE  INSTRUCTIONS BELOW ARE ONLY FOR THOSE PATIENTS WHO HAVE BEEN PRESCRIBED SEDATION OR GENERAL ANESTHESIA DURING THEIR MRI PROCEDURE:  IF YOUR DOCTOR PRESCRIBED ORAL SEDATION (take medicine to help you relax during your exam):   You must get the medicine from your doctor (oral medication) before you arrive. Bring the medicine to the exam. Do not take it at home. You ll be told when to take it upon arriving for your exam.   Arrive one hour early. Bring someone who can take you home after the test. Your medicine will make you sleepy. After the exam, you may not drive, take a bus or take a taxi by yourself.  IF YOUR DOCTOR PRESCRIBED IV SEDATION:   Arrive one hour early. Bring someone who can take you home after the test. Your medicine will make you sleepy. After the exam, you may not drive, take a bus or take a taxi by yourself.   No eating 6 hours before your exam. You may have clear liquids up until 4 hours before your exam. (Clear liquids include water, clear tea, black coffee and fruit juice without pulp.)  IF YOUR DOCTOR PRESCRIBED ANESTHESIA (be asleep for your exam):   Arrive 1 1/2 hours early. Bring someone who can take you home after the test. You may not drive, take a bus or take a taxi by yourself.   No eating 8 hours before your exam. You may have clear liquids up until 4 hours before your exam. (Clear liquids include water, clear tea, black coffee and fruit juice without pulp.)   You will spend four to five hours in the recovery room.  If you have any questions, please contact your Imaging Department exam site.            Sep 14, 2018 12:30 PM CDT   Return Visit with Brigitte Aguilar MD   [facility] ([facility])    [facility]  [address]  [facility] [address]   [phone]              Future tests that were ordered for you today     Open Future Orders        Priority Expected Expires Ordered    MR Breast Bilateral w/o & w Contrast  Routine 6/3/2018 3/2/2019 3/2/2018            Who to contact     If you have questions or need follow up information about today's clinic visit or your schedule please contact [facility] directly at [phone].  Normal or non-critical lab and imaging results will be communicated to you by MyChart, letter or phone within 4 business days after the clinic has received the results. If you do not hear from us within 7 days, please contact the clinic through [employer] or phone. If you have a critical or abnormal lab result, we will notify you by phone as soon as possible.  Submit refill requests through [employer] or call your pharmacy and they will forward the refill request to us. Please allow 3 business days for your refill to be completed.          Additional Information About Your Visit        [employer] Information     [employer] gives you secure access to your electronic health record. If you see a primary care provider, you can also send messages to your care team and make appointments. If you have questions, please call your primary care clinic.  If you do not have a primary care provider, please call [phone] and they will assist you.        Care EveryWhere ID     This is your Care EveryWhere ID. This could be used by other organizations to access your [place] medical records  [id number]        Your Vitals Were     Pulse Temperature Respirations Pulse Oximetry BMI (Body Mass Index)       67 98  F (36.7  C) (Oral) 16 100% 25.44 kg/m2        Blood Pressure from Last 3 Encounters:   03/02/18 94/60   09/15/17 106/67   01/06/17 111/75    Weight from Last 3 Encounters:   03/02/18 65.1 kg (143 lb 9.6 oz)   09/15/17 68 kg (150 lb)   01/06/17 68 kg (150 lb)               Primary Care Provider Office Phone # Fax #    Brigitte Aguilar [phone] [address] [facility] [address]        Equal Access to Services     JOHANN PATEL : colleen Meadows  tessy brian malcomalexa carvalho ah. So [facility] [phone].    ATENCIÓN: Si annmarie mcleod, tiene a maharaj disposición servicios gratuitos de asistencia lingüística. Annetta al [phone].    We comply with applicable federal civil rights laws and [place] laws. We do not discriminate on the basis of race, color, national origin, age, disability, sex, sexual orientation, or gender identity.            Thank you!     Thank you for choosing [facility]  for your care. Our goal is always to provide you with excellent care. Hearing back from our patients is one way we can continue to improve our services. Please take a few minutes to complete the written survey that you may receive in the mail after your visit with us. Thank you!             Your Updated Medication List - Protect others around you: Learn how to safely use, store and throw away your medicines at www.disposemymeds.org.          This list is accurate as of 3/2/18  1:10 PM.  Always use your most recent med list.                   Brand Name Dispense Instructions for use Diagnosis    ACYCLOVIR PO      Take 400 mg by mouth 5 times daily As needed for cold sores        cyanocobalamin 1000 MCG Subl sublingual tablet      Place 1,000 mcg under the tongue every 7 days        FLUOXETINE HCL PO      Take by mouth daily        ibuprofen 600 MG tablet    ADVIL/MOTRIN    120 tablet    Take 1 tablet (600 mg) by mouth every 6 hours as needed for moderate pain    S/P hysterectomy       IMITREX PO      Take 100 mg by mouth daily as needed for migraine (100 mg at onset of migraine and MRx1 prn)        propranolol 40 MG tablet    INDERAL     Take 40 mg by mouth 2 times daily    Chronic mixed headache syndrome       senna-docusate 8.6-50 MG per tablet    SENOKOT-S;PERICOLACE    100 tablet    Take 1-2 tablets by mouth 2 times daily    S/P hysterectomy       TOPAMAX PO      Take 100 mg by mouth At Bedtime        TYLENOL PO       Take 1,000 mg by mouth 2 times daily as needed for mild pain or fever        VITAMIN D3 PO      Take 5,000 Units by mouth daily

## 2018-03-02 NOTE — PROGRESS NOTES
"Oncology Rooming Note    March 2, 2018 12:32 PM   Malu Benavides is a 47 year old female who presents for:    Chief Complaint   Patient presents with     Oncology Clinic Visit     Malignant neoplasm of right breast      Initial Vitals: BP 94/60 (BP Location: Left arm, Patient Position: Sitting, Cuff Size: Adult Regular)  Pulse 67  Temp 98  F (36.7  C) (Oral)  Resp 16  Wt 65.1 kg (143 lb 9.6 oz)  SpO2 100%  BMI 25.44 kg/m2 Estimated body mass index is 25.44 kg/(m^2) as calculated from the following:    Height as of 1/6/17: 1.6 m (5' 3\").    Weight as of this encounter: 65.1 kg (143 lb 9.6 oz). Body surface area is 1.7 meters squared.  No Pain (0) Comment: Data Unavailable   No LMP recorded. Patient is not currently having periods (Reason: UNKNOWN).  Allergies reviewed: Yes  Medications reviewed: Yes    Medications: Medication refills not needed today.  Pharmacy name entered into [facility]:    [facility] [id number] [place] [facility] - [address] [facility] [place] [place] - [address] AT PORTAL TO REGISTERED [facility] SITES  [facility] [address] [place] [facility] [place] - [address] [facility] [place] - [address]    Clinical concerns: None             4 minutes for nursing intake (face to face time)     Janeen Morley MA            "

## 2018-03-02 NOTE — PROGRESS NOTES
[facility]    Hematology/Oncology Established Patient Follow-up Note      Today's Date: 3/02/2018    Reason for Follow-up:  right breast [id number] ER-/WY-, Her 2 + IDC, s/p lumpectomy s/p adjuvant TCHP x 5, completed radiation on 7/28/15, completed 1 year of Herceptin.    HISTORY OF PRESENT ILLNESS: Malu Benavides is a 47 year old female who presents for follow-up for her breast cancer.  She was previously a patient of Dr. Long, then Dr. Sterlnig.  She presented at age 44, her routine mammogram demonstrated an abnormality in the right breast. Biopsy done 12/17/2014 showed grade 3 infiltrating ductal adenocarcinoma. Estrogen and progesterone receptors were negative. HER-2 was positive by FISH with a ratio of 8.5 and staging evaluation for metastatic disease was negative. She experienced a right lumpectomy and sentinel node biopsy on 01/15/2015 demonstrating that the tumor measured 1.2 x 0.8 cm. It was ER/WY negative and HER-2 positive. The solitary sentinel node was negative for metastatic disease. The tumor was therefore staged as pT1c N0 M0, HER-2 positive breast cancer.   She did not have breast complaints on presentation.     Dr. Long recommend TCHP in adjuvant setting with Taxotere, carboplatin, Herceptin and pertuzumab. She had C1-C5 from 01/29/2015 to 4/24/2015. C6 chemo is d/c by Dr. Long due to severe neuropathy. She is advised on continue Herceptin to finish total 1 yr duration of Tx.     She completed radiation 6/5/15-7/28/15.    She completed 1 year of Herceptin in January 2016.    She underwent MISSY/BSO on 4/11/16.      INTERIM HISTORY:  Malu is here for her follow-up today.  She feels well.  She denies feeling any new lumps/bumps or new pains.  She denies any new complaints today.        REVIEW OF SYSTEMS:   14 point ROS was reviewed and is negative other than as noted above in HPI.     HOME MEDICATIONS:  Current Outpatient Prescriptions   Medication Sig Dispense Refill      FLUOXETINE HCL PO Take by mouth daily       ibuprofen (ADVIL,MOTRIN) 600 MG tablet Take 1 tablet (600 mg) by mouth every 6 hours as needed for moderate pain 120 tablet      senna-docusate (SENOKOT-S;PERICOLACE) 8.6-50 MG per tablet Take 1-2 tablets by mouth 2 times daily 100 tablet      ACYCLOVIR PO Take 400 mg by mouth 5 times daily As needed for cold sores       SUMAtriptan Succinate (IMITREX PO) Take 100 mg by mouth daily as needed for migraine (100 mg at onset of migraine and MRx1 prn)       cyanocobalamin 1000 MCG SUBL Place 1,000 mcg under the tongue every 7 days       Acetaminophen (TYLENOL PO) Take 1,000 mg by mouth 2 times daily as needed for mild pain or fever       Topiramate (TOPAMAX PO) Take 100 mg by mouth At Bedtime       Cholecalciferol (VITAMIN D3 PO) Take 5,000 Units by mouth daily        propranolol (INDERAL) 40 MG tablet Take 40 mg by mouth 2 times daily            ALLERGIES:  Allergies   Allergen Reactions     No Known Allergies          PAST MEDICAL HISTORY:  Past Medical History:   Diagnosis Date     Adjustment disorder with mixed anxiety and depressed mood 2009    anxiety initially with secondary depressive sx 2013     Breast cancer (H) 2015     Cervical cancer (H)      Chronic mixed headache syndrome     chronic daily headache and migraine     Dysthymic disorder 2013    Persistent anhedonia and fatigue     Former smoker     quit 2005, 8 pack year history     IBS (irritable bowel syndrome)     diarrhea predominate     Noninfectious ileitis      Papanicolaou smear of cervix with high grade squamous intraepithelial lesion (HGSIL) 2005    LEEP     Pregnancy induced hypertension     pregnancy induced hypertension     Recurrent herpes labialis     labialis     S/p small bowel obstruction     small bowel obstruction following appy, treated nonsurgically     Supervision of other normal pregnancy      - incomplete AB with suction curretage, C section for failure to progress      Tubulovillous adenoma of rectum 8/2013    2 cm tubullvillous adenoma with no dysplasia         PAST SURGICAL HISTORY:  Past Surgical History:   Procedure Laterality Date     BIOPSY BREAST SEED LOCALIZATION Right 1/14/2015    Procedure: BIOPSY BREAST SEED LOCALIZATION;  Surgeon: Brant Townsend MD;  Location: [facility]     BREAST SURGERY       C APPENDECTOMY  1993     C EXPLORATORY OF ABDOMEN  1/2006    laparoscopy, mini laparotomy for drainage ovarian cyst, colonic adhesions     C/SECTION, LOW TRANSVERSE  1/2007    low segment transverse C section, extensive lysis of adhesions and repair of serosal bowel injuries     COLONOSCOPY  8/26/2013    2 mm polyp distal transverse colon(benign mucosa), 20 mm polyp recto-sigmoid colon(tubulovillous adenoma), repeat 3-5 years     CT SCAN ABDOMEN/PELVIS  8/2010    5-6 mm cyst caudate lobe of the liver, anteroverted uterus, 2.5x2.2 cm left adexal cyst     HC COLP CERVIX/UPPER VAGINA W LOOP ELEC BX CERVIX  1/2005     HC MRI BRAIN W/O CONTRAST  11/2004    paranasal sinus mucosal thickening, normal MRI brain       HYSTERECTOMY TOTAL ABDOMINAL, BILATERAL SALPINGO-OOPHORECTOMY, COMBINED N/A 4/11/2016    Procedure: COMBINED HYSTERECTOMY TOTAL ABDOMINAL, SALPINGO-OOPHORECTOMY;  Surgeon: Isamar Garza MD;  Location:  OR     OVERNIGHT OXIMETRY STUDY  2/2013    event index 1.9/hr, average O2 sat 96%, 16 sec with O2 sat < 88%, stable heart rate     SURGICAL HISTORY OF -   2004    suction curretage     SURGICAL HISTORY OF -   1/2007    Primary repair of multiple small bowel/sigmord colon serosal tears.         SOCIAL HISTORY:  Social History     Social History     Marital status:      Spouse name: Mhiai     Number of children: 1     Years of education: 14     Occupational History      [employer]     Social History Main Topics     Smoking status: Current Some Day Smoker     Packs/day: 0.50     Years: 15.00     Types: Cigarettes     Last  attempt to quit: 2013     Smokeless tobacco: Never Used     Alcohol use Yes      Comment: 2-4 beers per week     Drug use: No     Sexual activity: Yes     Partners: Male      Comment: same relationship since      Other Topics Concern      Service No     Blood Transfusions No     Caffeine Concern Yes     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  Has two dogs.         FAMILY HISTORY:  Family History   Problem Relation Age of Onset     Kidney Cancer Father 59     renal cell carcinoma     OSTEOPOROSIS Father      related to cancer     Connective Tissue Disorder Mother      rheumatoid arthritis     Chronic Obstructive Pulmonary Disease Mother      COPD, smoker     Cardiovascular Mother      carotid endarterectomy,peripheral vascular disease, [place] amputation, smoker     Hypertension Mother      OSTEOPOROSIS Mother      prednisone, no fractures     Neurologic Disorder Sister      headaches     Hypertension Brother      Hypertension Maternal Grandfather      CEREBROVASCULAR DISEASE Paternal Grandfather      Breast Cancer Cousin 51     two maternal cousins, diagnosed at 51 and 52     Cervical Cancer Maternal Grandmother 30      at 56         PHYSICAL EXAM:  Vital signs:  BP 94/60 (BP Location: Left arm, Patient Position: Sitting, Cuff Size: Adult Regular)  Pulse 67  Temp 98  F (36.7  C) (Oral)  Resp 16  Wt 65.1 kg (143 lb 9.6 oz)  SpO2 100%  BMI 25.44 kg/m2   ECO  GENERAL/CONSTITUTIONAL: No acute distress.  EYES: No scleral icterus.  LYMPH: No anterior cervical, posterior cervical, supraclavicular, or axillary adenopathy.   RESPIRATORY: Clear to auscultation  bilaterally. No crackles or wheezing.   CARDIOVASCULAR: Regular rate and rhythm without murmurs, gallops, or rubs.  GASTROINTESTINAL: No tenderness. The patient has normal bowel sounds. No guarding.  No distention.  BREAST: Right-s/p right lumpectomy, scarring near the lumpectomy site at the 11 o'clock position; Left-no palpable mass, discharge, rash, or axillary lymphadenopathy.   MUSCULOSKELETAL: Warm and well-perfused, no cyanosis, clubbing, or edema.  NEUROLOGIC: Alert, oriented, answers questions appropriately.  INTEGUMENTARY: No rashes or jaundice.  GAIT: Steady, does not use assistive device      LABS:  None.      IMAGING:  MRI breast 6/2/17:     FINDINGS:      Right Breast: There is mild background parenchymal enhancement. The  patient has had a prior right lumpectomy.     There are no areas of suspicious enhancement or lymphadenopathy.     Left Breast: There is mild background parenchymal enhancement.     There are no areas of suspicious enhancement or lymphadenopathy.         IMPRESSION:   BI-RADS 2, BENIGN. No MR evidence of malignancy.       Bilateral mammogram 12/15/17:  BI-RADS 2 - benign      ASSESSMENT/PLAN:  Malu Benavides is a 47 year old female with:    1) Right breast [id number] ER/NJ-, Her 2 + IDC, s/p lumpectomy, adjuvant TCHP x 5. C6 Taxotere is not delivered due to neuropathy. She has completed 1 year of Herceptin.  She had ER/NJ - negative disease, so she would not benefit from oral antihormone therapy.     She did see genetic counseling due to young age at diagnosis - showed a variant of uncertain significance in the BRCA2 gene.  The significance of this is unknown with regards to her breast cancer recurrence risk and ovarian cancer risk.  She did have a baseline transvaginal ultrasound and CA-125 test that were negative.  She has no family history of ovarian cancer, but does have 2 maternal cousins with breast cancer.  She is concerned about ovarian cancer, and has seen her gynecologist  about pursuing an oopherectomy.  I discussed the case at our breast cancer tumor conference, and consensus was to undergo surveillance like high-risk breast cancer patient, with MRI breasts and mammograms alternating every 6 months.  She strongly desired to have her ovaries removed, in light of her indeterminate BRCA and history of breast cancer.    Ultimately, she underwent MISSY/BSO on 4/11/16.    Mammogram on 12/15/17 was benign.    -MRI breast in June 2018  -RTC in 6 months for follow-up    2) Neuropathy: resolved. She has tapered off the gabapentin.     3) Pulmonary nodule on CT 12/2014. She is a former smoker, quit 2/2015. Repeat CT chest in July 2015 shows no evidence for metastatic disease.  The tiny nodule is thought most likely secondary to prominent vascular structure.    4) She had her screening colonoscopy on 12/21/15, which showed a colon polyp (tubular adenoma) and hemorrhoids.  Her next colonoscopy will be in 3 years from then.    5) Migraines:  -she follows with her neurologist      I spent a total of 25 minutes with the patient, with over >50% of the time in counseling and/or coordination of care.      Brigitte Aguilar MD  Hematology/Oncology  [facility]

## 2018-06-08 ENCOUNTER — HOSPITAL ENCOUNTER (OUTPATIENT)
Dept: MRI IMAGING | Facility: CLINIC | Age: 48
Discharge: HOME | End: 2018-06-08
Attending: INTERNAL MEDICINE | Admitting: INTERNAL MEDICINE
Payer: COMMERCIAL

## 2018-06-08 DIAGNOSIS — C50.911 MALIGNANT NEOPLASM OF RIGHT BREAST IN FEMALE, ESTROGEN RECEPTOR NEGATIVE, UNSPECIFIED SITE OF BREAST (H): ICD-10-CM

## 2018-06-08 DIAGNOSIS — Z17.1 MALIGNANT NEOPLASM OF RIGHT BREAST IN FEMALE, ESTROGEN RECEPTOR NEGATIVE, UNSPECIFIED SITE OF BREAST (H): ICD-10-CM

## 2018-06-08 PROCEDURE — A9585 GADOBUTROL INJECTION: HCPCS | Performed by: INTERNAL MEDICINE

## 2018-06-08 PROCEDURE — 27210995 ZZH RX 272: Performed by: INTERNAL MEDICINE

## 2018-06-08 PROCEDURE — 77059 MR BREAST BILATERAL W/O & W CONTRAST: CPT

## 2018-06-08 PROCEDURE — 25000128 H RX IP 250 OP 636: Performed by: INTERNAL MEDICINE

## 2018-06-08 RX ORDER — ACYCLOVIR 200 MG/1
60 CAPSULE ORAL ONCE
Status: COMPLETED | OUTPATIENT
Start: 2018-06-08 | End: 2018-06-08

## 2018-06-08 RX ORDER — GADOBUTROL 604.72 MG/ML
7 INJECTION INTRAVENOUS ONCE
Status: COMPLETED | OUTPATIENT
Start: 2018-06-08 | End: 2018-06-08

## 2018-06-08 RX ADMIN — GADOBUTROL 7 ML: 604.72 INJECTION INTRAVENOUS at 12:58

## 2018-06-08 RX ADMIN — SODIUM CHLORIDE 60 ML: 9 INJECTION, SOLUTION INTRAMUSCULAR; INTRAVENOUS; SUBCUTANEOUS at 12:58

## 2018-08-05 ENCOUNTER — HEALTH MAINTENANCE LETTER (OUTPATIENT)
Age: 48
End: 2018-08-05

## 2018-09-14 ENCOUNTER — ONCOLOGY VISIT (OUTPATIENT)
Dept: ONCOLOGY | Facility: CLINIC | Age: 48
End: 2018-09-14
Attending: INTERNAL MEDICINE
Payer: COMMERCIAL

## 2018-09-14 VITALS
DIASTOLIC BLOOD PRESSURE: 61 MMHG | OXYGEN SATURATION: 100 % | HEART RATE: 61 BPM | RESPIRATION RATE: 10 BRPM | TEMPERATURE: 98.2 F | BODY MASS INDEX: 25.15 KG/M2 | SYSTOLIC BLOOD PRESSURE: 98 MMHG | WEIGHT: 142 LBS

## 2018-09-14 DIAGNOSIS — Z12.39 BREAST CANCER SCREENING: ICD-10-CM

## 2018-09-14 DIAGNOSIS — Z17.1 MALIGNANT NEOPLASM OF RIGHT BREAST IN FEMALE, ESTROGEN RECEPTOR NEGATIVE, UNSPECIFIED SITE OF BREAST (H): Primary | ICD-10-CM

## 2018-09-14 DIAGNOSIS — C50.911 MALIGNANT NEOPLASM OF RIGHT BREAST IN FEMALE, ESTROGEN RECEPTOR NEGATIVE, UNSPECIFIED SITE OF BREAST (H): Primary | ICD-10-CM

## 2018-09-14 PROCEDURE — 99214 OFFICE O/P EST MOD 30 MIN: CPT | Performed by: INTERNAL MEDICINE

## 2018-09-14 PROCEDURE — G0463 HOSPITAL OUTPT CLINIC VISIT: HCPCS

## 2018-09-14 ASSESSMENT — PAIN SCALES - GENERAL: PAINLEVEL: NO PAIN (0)

## 2018-09-14 NOTE — PROGRESS NOTES
[facility]    Hematology/Oncology Established Patient Follow-up Note      Today's Date: 9/14/2018    Reason for Follow-up:  right breast [id number] ER-/NH-, Her 2 + IDC, s/p lumpectomy s/p adjuvant TCHP x 5, completed radiation on 7/28/15, completed 1 year of Herceptin.    HISTORY OF PRESENT ILLNESS: Malu Benavides is a 48 year old female who presents for follow-up for her breast cancer.  She was previously a patient of Dr. Long, then Dr. Sterling.  She presented at age 44, her routine mammogram demonstrated an abnormality in the right breast. Biopsy done 12/17/2014 showed grade 3 infiltrating ductal adenocarcinoma. Estrogen and progesterone receptors were negative. HER-2 was positive by FISH with a ratio of 8.5 and staging evaluation for metastatic disease was negative. She experienced a right lumpectomy and sentinel node biopsy on 01/15/2015 demonstrating that the tumor measured 1.2 x 0.8 cm. It was ER/NH negative and HER-2 positive. The solitary sentinel node was negative for metastatic disease. The tumor was therefore staged as pT1c N0 M0, HER-2 positive breast cancer.   She did not have breast complaints on presentation.     Dr. Long recommend TCHP in adjuvant setting with Taxotere, carboplatin, Herceptin and pertuzumab. She had C1-C5 from 01/29/2015 to 4/24/2015. C6 chemo is d/c by Dr. Long due to severe neuropathy. She is advised on continue Herceptin to finish total 1 yr duration of Tx.     She completed radiation 6/5/15-7/28/15.    She completed 1 year of Herceptin in January 2016.    She underwent MISSY/BSO on 4/11/16.      INTERIM HISTORY:  Malu is here for her follow-up today.  She says that she is doing well.  She denies any new complaints today.  She denies new lumps/bumps or new pains.      REVIEW OF SYSTEMS:   14 point ROS was reviewed and is negative other than as noted above in HPI.     HOME MEDICATIONS:  Current Outpatient Prescriptions   Medication Sig Dispense Refill      ACYCLOVIR PO Take 400 mg by mouth 5 times daily As needed for cold sores       FLUOXETINE HCL PO Take by mouth daily       ibuprofen (ADVIL,MOTRIN) 600 MG tablet Take 1 tablet (600 mg) by mouth every 6 hours as needed for moderate pain 120 tablet      propranolol (INDERAL) 40 MG tablet Take 40 mg by mouth 2 times daily        SUMAtriptan Succinate (IMITREX PO) Take 100 mg by mouth daily as needed for migraine (100 mg at onset of migraine and MRx1 prn)       Topiramate (TOPAMAX PO) Take 100 mg by mouth At Bedtime       Acetaminophen (TYLENOL PO) Take 1,000 mg by mouth 2 times daily as needed for mild pain or fever           ALLERGIES:  Allergies   Allergen Reactions     No Known Allergies          PAST MEDICAL HISTORY:  Past Medical History:   Diagnosis Date     Adjustment disorder with mixed anxiety and depressed mood 2009    anxiety initially with secondary depressive sx 2013     Breast cancer (H) 2015     Cervical cancer (H)      Chronic mixed headache syndrome     chronic daily headache and migraine     Dysthymic disorder 2013    Persistent anhedonia and fatigue     Former smoker     quit 2005, 8 pack year history     IBS (irritable bowel syndrome)     diarrhea predominate     Noninfectious ileitis      Papanicolaou smear of cervix with high grade squamous intraepithelial lesion (HGSIL) 2005    LEEP     Pregnancy induced hypertension     pregnancy induced hypertension     Recurrent herpes labialis     labialis     S/p small bowel obstruction     small bowel obstruction following appy, treated nonsurgically     Supervision of other normal pregnancy      - incomplete AB with suction curretage, C section for failure to progress     Tubulovillous adenoma of rectum 2013    2 cm tubullvillous adenoma with no dysplasia         PAST SURGICAL HISTORY:  Past Surgical History:   Procedure Laterality Date     BIOPSY BREAST SEED LOCALIZATION Right 2015    Procedure: BIOPSY BREAST SEED  LOCALIZATION;  Surgeon: Brant Townsend MD;  Location: [facility]     BREAST SURGERY       C APPENDECTOMY  1993     C EXPLORATORY OF ABDOMEN  1/2006    laparoscopy, mini laparotomy for drainage ovarian cyst, colonic adhesions     C/SECTION, LOW TRANSVERSE  1/2007    low segment transverse C section, extensive lysis of adhesions and repair of serosal bowel injuries     COLONOSCOPY  8/26/2013    2 mm polyp distal transverse colon(benign mucosa), 20 mm polyp recto-sigmoid colon(tubulovillous adenoma), repeat 3-5 years     CT SCAN ABDOMEN/PELVIS  8/2010    5-6 mm cyst caudate lobe of the liver, anteroverted uterus, 2.5x2.2 cm left adexal cyst     HC COLP CERVIX/UPPER VAGINA W LOOP ELEC BX CERVIX  1/2005     HC MRI BRAIN W/O CONTRAST  11/2004    paranasal sinus mucosal thickening, normal MRI brain       HYSTERECTOMY TOTAL ABDOMINAL, BILATERAL SALPINGO-OOPHORECTOMY, COMBINED N/A 4/11/2016    Procedure: COMBINED HYSTERECTOMY TOTAL ABDOMINAL, SALPINGO-OOPHORECTOMY;  Surgeon: Isamar Garza MD;  Location:  OR     OVERNIGHT OXIMETRY STUDY  2/2013    event index 1.9/hr, average O2 sat 96%, 16 sec with O2 sat < 88%, stable heart rate     SURGICAL HISTORY OF -   2004    suction curretage     SURGICAL HISTORY OF -   1/2007    Primary repair of multiple small bowel/sigmord colon serosal tears.         SOCIAL HISTORY:  Social History     Social History     Marital status:      Spouse name: Mihai     Number of children: 1     Years of education: 14     Occupational History      [employer]     Social History Main Topics     Smoking status: Current Some Day Smoker     Packs/day: 0.50     Years: 15.00     Types: Cigarettes     Last attempt to quit: 1/1/2013     Smokeless tobacco: Never Used     Alcohol use Yes      Comment: 2-4 beers per week     Drug use: No     Sexual activity: Yes     Partners: Male      Comment: same relationship since 1988     Other Topics Concern       Service No     Blood Transfusions No     Caffeine Concern Yes     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  Has two dogs.         FAMILY HISTORY:  Family History   Problem Relation Age of Onset     Kidney Cancer Father 59     renal cell carcinoma     Osteoporosis Father      related to cancer     Connective Tissue Disorder Mother      rheumatoid arthritis     Chronic Obstructive Pulmonary Disease Mother      COPD, smoker     Cardiovascular Mother      carotid endarterectomy,peripheral vascular disease, [place] amputation, smoker     Hypertension Mother      Osteoporosis Mother      prednisone, no fractures     Hypertension Brother      Hypertension Maternal Grandfather      Cervical Cancer Maternal Grandmother 30      at 56     Cerebrovascular Disease Paternal Grandfather      Neurologic Disorder Sister      headaches     Breast Cancer Cousin 51     two maternal cousins, diagnosed at 51 and 52         PHYSICAL EXAM:  Vital signs:  BP 98/61 (BP Location: Right arm, Patient Position: Chair, Cuff Size: Adult Regular)  Pulse 61  Temp 98.2  F (36.8  C) (Oral)  Resp 10  Wt 64.4 kg (142 lb)  SpO2 100%  BMI 25.15 kg/m2   ECO  GENERAL/CONSTITUTIONAL: No acute distress.  EYES: No scleral icterus.  LYMPH: No anterior cervical, posterior cervical, supraclavicular, or axillary adenopathy.   RESPIRATORY: Clear to auscultation bilaterally. No crackles or wheezing.   CARDIOVASCULAR: Regular rate and rhythm without murmurs, gallops, or rubs.  GASTROINTESTINAL: No tenderness. The patient has normal bowel sounds. No guarding.  No distention.  BREAST: Right-s/p right lumpectomy, scarring near  the lumpectomy site at the 11 o'clock position; Left-no palpable mass, discharge, rash, or axillary lymphadenopathy.   MUSCULOSKELETAL: Warm and well-perfused, no cyanosis, clubbing, or edema.  NEUROLOGIC: Alert, oriented, answers questions appropriately.  INTEGUMENTARY: No rashes or jaundice.  GAIT: Steady, does not use assistive device      LABS:  None.      IMAGING:  Bilateral mammogram 12/15/17:  BI-RADS 2 - benign    MRI breast 6/8/18:  Findings: Breast conservation therapy changes are seen in the right  breast.      No concerning areas of enhancement in either breast.      No lymphadenopathy.         Impression: BI-RADS CATEGORY: 2 - Benign Finding(s).        ASSESSMENT/PLAN:  Malu Benavides is a 48 year old female with:    1) Right breast [id number] ER/MO-, Her 2 + IDC, s/p lumpectomy, adjuvant TCHP x 5. C6 Taxotere is not delivered due to neuropathy. She has completed 1 year of Herceptin.  She had ER/MO - negative disease, so she would not benefit from oral antihormone therapy.     She did see genetic counseling due to young age at diagnosis - showed a variant of uncertain significance in the BRCA2 gene.  The significance of this is unknown with regards to her breast cancer recurrence risk and ovarian cancer risk.  She did have a baseline transvaginal ultrasound and CA-125 test that were negative.  She has no family history of ovarian cancer, but does have 2 maternal cousins with breast cancer.  She is concerned about ovarian cancer, and has undergone MISSY/BSO.  I discussed the case at our breast cancer tumor conference, and consensus was to undergo surveillance like high-risk breast cancer patient, with MRI breasts and mammograms alternating every 6 months.  She strongly desired to have her ovaries removed, in light of her indeterminate BRCA and history of breast cancer.    Ultimately, she underwent MISSY/BSO on 4/11/16.    MRI breast on 6/8/18 was benign.    -mammogram in December 2018  -RTC in 6 months for  follow-up    2) Neuropathy: resolved. She has tapered off the gabapentin.     3) Pulmonary nodule on CT 12/2014. She is a former smoker, quit 2/2015. Repeat CT chest in July 2015 shows no evidence for metastatic disease.  The tiny nodule is thought most likely secondary to prominent vascular structure.    4) She had her screening colonoscopy on 12/21/15, which showed a colon polyp (tubular adenoma) and hemorrhoids.  Her next colonoscopy will be in 3 years from then.    -She says that this will done December 2018.    5) Migraines:  -she follows with her neurologist      I spent a total of 25 minutes with the patient, with over >50% of the time in counseling and/or coordination of care.      Brigitte Aguilar MD  Hematology/Oncology  [facility]

## 2018-09-14 NOTE — MR AVS SNAPSHOT
"              After Visit Summary   9/14/2018    Malu Benavides    MRN: [id number]           Patient Information     Date Of Birth          1970        Visit Information        Provider Department      9/14/2018 1:00 PM Brigitte Aguilar MD [facility]        Today's Diagnoses     Malignant neoplasm of right breast in female, estrogen receptor negative, unspecified site of breast (H)    -  1    Breast cancer screening          Care Instructions    -schedule mammogram in December 2018  -return to clinic in 6 months          Follow-ups after your visit        Your next 10 appointments already scheduled     Dec 21, 2018  1:00 PM CST   MA SCREENING BILATERAL W/ OMER with SHBCMA2   [facility] ([facility])    [address] [place] [address], [address]  [facility] [address] [phone]           Three-dimensional (3D) mammograms are available at [place] locations in [facility], [place], [place], [facility], [place], [place], and [place]. [facility] locations include [place] and [facility] in [place]. Benefits of 3D mammograms include: - Improved rate of cancer detection - Decreases your chance of having to go back for more tests, which means fewer: - \"False-positive\" results (This means that there is an abnormal area but it isn't cancer.) - Invasive testing procedures, such as a biopsy or surgery - Can provide clearer images of the breast if you have dense breast tissue. 3D mammography is an optional exam that anyone can have with a 2D mammogram. It doesn't replace or take the place of a 2D mammogram. 2D mammograms remain an effective screening test for all women.  Not all insurance companies cover the cost of a 3D mammogram. Check with your insurance.            Mar 15, 2019  1:00 PM CDT   Return Visit with Brigitte Aguilar MD   [facility] ([facility])    On license of [facility] " [place]  [address]  [place] [id number] [phone]              Future tests that were ordered for you today     Open Future Orders        Priority Expected Expires Ordered    MA Screen Bilateral w/Candelario Routine 12/16/2018 9/14/2019 9/14/2018            Who to contact     If you have questions or need follow up information about today's clinic visit or your schedule please contact [facility] directly at [phone].  Normal or non-critical lab and imaging results will be communicated to you by blogTVhart, letter or phone within 4 business days after the clinic has received the results. If you do not hear from us within 7 days, please contact the clinic through EpiGaNt or phone. If you have a critical or abnormal lab result, we will notify you by phone as soon as possible.  Submit refill requests through Affymax or call your pharmacy and they will forward the refill request to us. Please allow 3 business days for your refill to be completed.          Additional Information About Your Visit        blogTVharMirapoint Software Information     Affymax gives you secure access to your electronic health record. If you see a primary care provider, you can also send messages to your care team and make appointments. If you have questions, please call your primary care clinic.  If you do not have a primary care provider, please call [phone] and they will assist you.        Care EveryWhere ID     This is your Care EveryWhere ID. This could be used by other organizations to access your [facility] medical records  [id number]        Your Vitals Were     Pulse Temperature Respirations Pulse Oximetry BMI (Body Mass Index)       61 98.2  F (36.8  C) (Oral) 10 100% 25.15 kg/m2        Blood Pressure from Last 3 Encounters:   09/14/18 98/61   03/02/18 94/60   09/15/17 106/67    Weight from Last 3 Encounters:   09/14/18 64.4 kg (142 lb)   03/02/18 65.1 kg (143 lb 9.6 oz)   09/15/17 68 kg (150 lb)                 Today's  Medication Changes          These changes are accurate as of 9/14/18  1:10 PM.  If you have any questions, ask your nurse or doctor.               Stop taking these medicines if you haven't already. Please contact your care team if you have questions.     cyanocobalamin 1000 MCG Subl sublingual tablet           senna-docusate 8.6-50 MG per tablet   Commonly known as:  SENOKOT-S;PERICOLACE           VITAMIN D3 PO                    Primary Care Provider Office Phone # Fax #    Brigitte Bhat Mei Aguilar [phone] [address] [facility] [address]        Equal Access to Services     [facility]: Hadii aad daya hadasho Sogen, waaxda luqadaha, qaybta kaalmada shawn, alexa patricia . So [facility] [phone].    ATENCIÓN: Si habla español, tiene a maharaj disposición servicios gratuitos de asistencia lingüística. [facility] [phone].    We comply with applicable federal civil rights laws and [place] laws. We do not discriminate on the basis of race, color, national origin, age, disability, sex, sexual orientation, or gender identity.            Thank you!     Thank you for choosing [facility]  for your care. Our goal is always to provide you with excellent care. Hearing back from our patients is one way we can continue to improve our services. Please take a few minutes to complete the written survey that you may receive in the mail after your visit with us. Thank you!             Your Updated Medication List - Protect others around you: Learn how to safely use, store and throw away your medicines at www.disposemymeds.org.          This list is accurate as of 9/14/18  1:10 PM.  Always use your most recent med list.                   Brand Name Dispense Instructions for use Diagnosis    ACYCLOVIR PO      Take 400 mg by mouth 5 times daily As needed for cold sores        FLUOXETINE HCL PO      Take by mouth daily        ibuprofen 600 MG tablet     ADVIL/MOTRIN    120 tablet    Take 1 tablet (600 mg) by mouth every 6 hours as needed for moderate pain    S/P hysterectomy       IMITREX PO      Take 100 mg by mouth daily as needed for migraine (100 mg at onset of migraine and MRx1 prn)        propranolol 40 MG tablet    INDERAL     Take 40 mg by mouth 2 times daily    Chronic mixed headache syndrome       TOPAMAX PO      Take 100 mg by mouth At Bedtime        TYLENOL PO      Take 1,000 mg by mouth 2 times daily as needed for mild pain or fever

## 2018-09-14 NOTE — PROGRESS NOTES
"Oncology Rooming Note    September 14, 2018 12:57 PM   Malu Benavides is a 48 year old female who presents for:    Chief Complaint   Patient presents with     Oncology Clinic Visit     Initial Vitals: BP 98/61 (BP Location: Right arm, Patient Position: Chair, Cuff Size: Adult Regular)  Pulse 61  Temp 98.2  F (36.8  C) (Oral)  Resp 10  Wt 64.4 kg (142 lb)  SpO2 100%  BMI 25.15 kg/m2 Estimated body mass index is 25.15 kg/(m^2) as calculated from the following:    Height as of 1/6/17: 1.6 m (5' 3\").    Weight as of this encounter: 64.4 kg (142 lb). Body surface area is 1.69 meters squared.  No Pain (0) Comment: Data Unavailable   No LMP recorded. Patient is not currently having periods (Reason: UNKNOWN).  Allergies reviewed: Yes  Medications reviewed: Yes    Medications: Medication refills not needed today.  Pharmacy name entered into [facility]:    [id number] [place] [facility] [place] - [address] [facility] [place], [place] - [address] AT PORTAL TO REGISTERED [facility] SITES  [facility] [address] [place] [facility] [place] - [address] [facility] [place], [place] - [address]    Clinical concerns: None    5 minutes for nursing intake (face to face time)     Brunilda White CMA              "

## 2018-12-17 ENCOUNTER — TRANSFERRED RECORDS (OUTPATIENT)
Dept: HEALTH INFORMATION MANAGEMENT | Facility: CLINIC | Age: 48
End: 2018-12-17

## 2018-12-21 ENCOUNTER — HOSPITAL ENCOUNTER (OUTPATIENT)
Dept: MAMMOGRAPHY | Facility: CLINIC | Age: 48
Discharge: HOME | End: 2018-12-21
Attending: INTERNAL MEDICINE | Admitting: INTERNAL MEDICINE
Payer: COMMERCIAL

## 2018-12-21 DIAGNOSIS — Z12.39 BREAST CANCER SCREENING: ICD-10-CM

## 2018-12-21 PROCEDURE — 77067 SCR MAMMO BI INCL CAD: CPT

## 2019-04-19 ENCOUNTER — ONCOLOGY VISIT (OUTPATIENT)
Dept: ONCOLOGY | Facility: CLINIC | Age: 49
End: 2019-04-19
Attending: INTERNAL MEDICINE
Payer: COMMERCIAL

## 2019-04-19 VITALS
OXYGEN SATURATION: 99 % | TEMPERATURE: 98.5 F | WEIGHT: 149 LBS | RESPIRATION RATE: 16 BRPM | BODY MASS INDEX: 26.4 KG/M2 | SYSTOLIC BLOOD PRESSURE: 95 MMHG | HEIGHT: 63 IN | HEART RATE: 72 BPM | DIASTOLIC BLOOD PRESSURE: 60 MMHG

## 2019-04-19 DIAGNOSIS — C50.011 MALIGNANT NEOPLASM OF NIPPLE OF RIGHT BREAST IN FEMALE, UNSPECIFIED ESTROGEN RECEPTOR STATUS (H): Primary | ICD-10-CM

## 2019-04-19 PROCEDURE — 99214 OFFICE O/P EST MOD 30 MIN: CPT | Performed by: INTERNAL MEDICINE

## 2019-04-19 PROCEDURE — G0463 HOSPITAL OUTPT CLINIC VISIT: HCPCS

## 2019-04-19 ASSESSMENT — PAIN SCALES - GENERAL: PAINLEVEL: NO PAIN (0)

## 2019-04-19 ASSESSMENT — MIFFLIN-ST. JEOR: SCORE: 1264.99

## 2019-04-19 NOTE — PROGRESS NOTES
[facility]    Hematology/Oncology Established Patient Follow-up Note      Today's Date: 4/19/2019    Reason for Follow-up:  right breast [id number] ER-/FL-, Her 2 + IDC, s/p lumpectomy s/p adjuvant TCHP x 5, completed radiation on 7/28/15, completed 1 year of Herceptin.    HISTORY OF PRESENT ILLNESS: Malu Benavides is a 48 year old female who presents for follow-up for her breast cancer.  She was previously a patient of Dr. Long, then Dr. Sterling.  She presented at age 44, her routine mammogram demonstrated an abnormality in the right breast. Biopsy done 12/17/2014 showed grade 3 infiltrating ductal adenocarcinoma. Estrogen and progesterone receptors were negative. HER-2 was positive by FISH with a ratio of 8.5 and staging evaluation for metastatic disease was negative. She experienced a right lumpectomy and sentinel node biopsy on 01/15/2015 demonstrating that the tumor measured 1.2 x 0.8 cm. It was ER/FL negative and HER-2 positive. The solitary sentinel node was negative for metastatic disease. The tumor was therefore staged as pT1c N0 M0, HER-2 positive breast cancer.   She did not have breast complaints on presentation.     Dr. Long recommend TCHP in adjuvant setting with Taxotere, carboplatin, Herceptin and pertuzumab. She had C1-C5 from 01/29/2015 to 4/24/2015. C6 chemo is d/c by Dr. Long due to severe neuropathy. She is advised on continue Herceptin to finish total 1 yr duration of Tx.     She completed radiation 6/5/15-7/28/15.    She completed 1 year of Herceptin in January 2016.    She underwent MISSY/BSO on 4/11/16.      INTERIM HISTORY:  Malu is here for her follow-up today.  She says that she is feeling well.  She denies any lumps/bumps or new pains.        REVIEW OF SYSTEMS:   14 point ROS was reviewed and is negative other than as noted above in HPI.     HOME MEDICATIONS:  Current Outpatient Medications   Medication Sig Dispense Refill     Acetaminophen (TYLENOL PO) Take  1,000 mg by mouth 2 times daily as needed for mild pain or fever       ACYCLOVIR PO Take 400 mg by mouth 5 times daily As needed for cold sores       FLUOXETINE HCL PO Take by mouth daily       propranolol (INDERAL) 40 MG tablet Take 40 mg by mouth 2 times daily        SUMAtriptan Succinate (IMITREX PO) Take 100 mg by mouth daily as needed for migraine (100 mg at onset of migraine and MRx1 prn)       Topiramate (TOPAMAX PO) Take 100 mg by mouth At Bedtime       ibuprofen (ADVIL,MOTRIN) 600 MG tablet Take 1 tablet (600 mg) by mouth every 6 hours as needed for moderate pain (Patient not taking: Reported on 2019) 120 tablet          ALLERGIES:  Allergies   Allergen Reactions     No Known Allergies          PAST MEDICAL HISTORY:  Past Medical History:   Diagnosis Date     Adjustment disorder with mixed anxiety and depressed mood     anxiety initially with secondary depressive sx      Breast cancer (H) 2015     Cervical cancer (H)      Chronic mixed headache syndrome     chronic daily headache and migraine     Dysthymic disorder 2013    Persistent anhedonia and fatigue     Former smoker     quit 2005, 8 pack year history     IBS (irritable bowel syndrome)     diarrhea predominate     Noninfectious ileitis      Papanicolaou smear of cervix with high grade squamous intraepithelial lesion (HGSIL) 2005    LEEP     Pregnancy induced hypertension     pregnancy induced hypertension     Recurrent herpes labialis     labialis     S/p small bowel obstruction     small bowel obstruction following appy, treated nonsurgically     Supervision of other normal pregnancy      - incomplete AB with suction curretage, C section for failure to progress     Tubulovillous adenoma of rectum 2013    2 cm tubullvillous adenoma with no dysplasia         PAST SURGICAL HISTORY:  Past Surgical History:   Procedure Laterality Date     BIOPSY BREAST SEED LOCALIZATION Right 2015    Procedure: BIOPSY BREAST SEED  LOCALIZATION;  Surgeon: Brant Townsend MD;  Location: [facility]     BREAST SURGERY       C APPENDECTOMY  1993     C EXPLORATORY OF ABDOMEN  1/2006    laparoscopy, mini laparotomy for drainage ovarian cyst, colonic adhesions     C/SECTION, LOW TRANSVERSE  1/2007    low segment transverse C section, extensive lysis of adhesions and repair of serosal bowel injuries     COLONOSCOPY  8/26/2013    2 mm polyp distal transverse colon(benign mucosa), 20 mm polyp recto-sigmoid colon(tubulovillous adenoma), repeat 3-5 years     CT SCAN ABDOMEN/PELVIS  8/2010    5-6 mm cyst caudate lobe of the liver, anteroverted uterus, 2.5x2.2 cm left adexal cyst     HC COLP CERVIX/UPPER VAGINA W LOOP ELEC BX CERVIX  1/2005     HC MRI BRAIN W/O CONTRAST  11/2004    paranasal sinus mucosal thickening, normal MRI brain       HYSTERECTOMY TOTAL ABDOMINAL, BILATERAL SALPINGO-OOPHORECTOMY, COMBINED N/A 4/11/2016    Procedure: COMBINED HYSTERECTOMY TOTAL ABDOMINAL, SALPINGO-OOPHORECTOMY;  Surgeon: Isamar Garza MD;  Location:  OR     OVERNIGHT OXIMETRY STUDY  2/2013    event index 1.9/hr, average O2 sat 96%, 16 sec with O2 sat < 88%, stable heart rate     SURGICAL HISTORY OF -   2004    suction curretage     SURGICAL HISTORY OF -   1/2007    Primary repair of multiple small bowel/sigmord colon serosal tears.         SOCIAL HISTORY:  Social History     Socioeconomic History     Marital status:      Spouse name: Mihai     Number of children: 1     Years of education: 14     Highest education level: Not on file   Occupational History     Occupation: quality tech     Employer: [employer]     Comment: [employer]   Social Needs     Financial resource strain: Not on file     Food insecurity:     Worry: Not on file     Inability: Not on file     Transportation needs:     Medical: Not on file     Non-medical: Not on file   Tobacco Use     Smoking status: Current Some Day Smoker     Packs/day: 0.50     Years: 15.00     Pack  years: 7.50     Types: Cigarettes     Last attempt to quit: 2013     Years since quittin.2     Smokeless tobacco: Never Used   Substance and Sexual Activity     Alcohol use: Yes     Comment: 2-4 beers per week     Drug use: No     Sexual activity: Yes     Partners: Male     Comment: same relationship since    Lifestyle     Physical activity:     Days per week: Not on file     Minutes per session: Not on file     Stress: Not on file   Relationships     Social connections:     Talks on phone: Not on file     Gets together: Not on file     Attends Restorationism service: Not on file     Active member of club or organization: Not on file     Attends meetings of clubs or organizations: Not on file     Relationship status: Not on file     Intimate partner violence:     Fear of current or ex partner: Not on file     Emotionally abused: Not on file     Physically abused: Not on file     Forced sexual activity: Not on file   Other Topics Concern      Service No     Blood Transfusions No     Caffeine Concern Yes     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 Not Asked   Social History Narrative    Lives with  and 6 year daughter.  Has two dogs.         FAMILY HISTORY:  Family History   Problem Relation Age of Onset     Kidney Cancer Father 59        renal cell carcinoma     Osteoporosis Father         related to cancer     Connective Tissue Disorder Mother         rheumatoid arthritis     Chronic Obstructive Pulmonary Disease Mother         COPD, smoker     Cardiovascular Mother         carotid endarterectomy,peripheral vascular disease, [place]  "amputation, smoker     Hypertension Mother      Osteoporosis Mother         prednisone, no fractures     Hypertension Brother      Hypertension Maternal Grandfather      Cervical Cancer Maternal Grandmother 30         at 56     Cerebrovascular Disease Paternal Grandfather      Neurologic Disorder Sister         headaches     Breast Cancer Cousin 51        two maternal cousins, diagnosed at 51 and 52         PHYSICAL EXAM:  Vital signs:  BP 95/60 (BP Location: Right arm, Cuff Size: Adult Regular)   Pulse 72   Temp 98.5  F (36.9  C) (Oral)   Resp 16   Ht 1.6 m (5' 3\")   Wt 67.6 kg (149 lb)   SpO2 99%   BMI 26.39 kg/m     ECO  GENERAL/CONSTITUTIONAL: No acute distress.  EYES: No scleral icterus.  LYMPH: No anterior cervical, posterior cervical, supraclavicular, or axillary adenopathy.   RESPIRATORY: Clear to auscultation bilaterally. No crackles or wheezing.   CARDIOVASCULAR: Regular rate and rhythm without murmurs, gallops, or rubs.  GASTROINTESTINAL: No tenderness. The patient has normal bowel sounds. No guarding.  No distention.  BREAST: Right-s/p right lumpectomy, scarring near the lumpectomy site at the 11 o'clock position; Left-no palpable mass, discharge, rash, or axillary lymphadenopathy.   MUSCULOSKELETAL: Warm and well-perfused, no cyanosis, clubbing, or edema.  NEUROLOGIC: Alert, oriented, answers questions appropriately.  INTEGUMENTARY: No rashes or jaundice.  GAIT: Steady, does not use assistive device      LABS:  None.      IMAGING:  MRI breast 18:  Findings: Breast conservation therapy changes are seen in the right  breast.      No concerning areas of enhancement in either breast.      No lymphadenopathy.         Impression: BI-RADS CATEGORY: 2 - Benign Finding(s).    Bilateral mammogram 18:  BI-RADS 2 - benign      ASSESSMENT/PLAN:  Malu Benavides is a 48 year old female with:    1) Right breast [id number] ER/RI-, Her 2 + IDC, s/p lumpectomy, adjuvant TCHP x 5. C6 Taxotere is " not delivered due to neuropathy. She has completed 1 year of Herceptin.  She had ER/IA - negative disease, so she would not benefit from oral antihormone therapy.     She did see genetic counseling due to young age at diagnosis - showed a variant of uncertain significance in the BRCA2 gene.  The significance of this is unknown with regards to her breast cancer recurrence risk and ovarian cancer risk.  She did have a baseline transvaginal ultrasound and CA-125 test that were negative.  She has no family history of ovarian cancer, but does have 2 maternal cousins with breast cancer.  She is concerned about ovarian cancer, and has undergone MISSY/BSO.  I discussed the case at our breast cancer tumor conference, and consensus was to undergo surveillance like high-risk breast cancer patient, with MRI breasts and mammograms alternating every 6 months.  She strongly desired to have her ovaries removed, in light of her indeterminate BRCA and history of breast cancer.    Ultimately, she underwent MISSY/BSO on 4/11/16.    Bilateral mammogram on 12/21/18 was benign.    -MRI breast in June 2019 - ordered  -RTC in 6 months for follow-up    2) Neuropathy: resolved. She has tapered off the gabapentin.     3) Pulmonary nodule on CT 12/2014. She is a former smoker, quit 2/2015. Repeat CT chest in July 2015 shows no evidence for metastatic disease.  The tiny nodule is thought most likely secondary to prominent vascular structure.    4) She had colonoscopy on 12/17/18, and 2 polyps were removed (sessile serrated adenoma and hyperplastic polyp).    -next colonoscopy was recommended for 5 years from then.    5) Migraines:  -she follows with her neurologist      I spent a total of 25 minutes with the patient, with over >50% of the time in counseling and/or coordination of care.      Brigitte Aguilar MD  Hematology/Oncology  [facility]

## 2019-04-19 NOTE — PROGRESS NOTES
"Oncology Rooming Note    April 19, 2019 12:51 PM   Malu Benavides is a 48 year old female who presents for:    Chief Complaint   Patient presents with     Oncology Clinic Visit     Malignant neoplasm of right breast (H)     Initial Vitals: BP 95/60 (BP Location: Right arm, Cuff Size: Adult Regular)   Pulse 72   Temp 98.5  F (36.9  C) (Oral)   Resp 16   Ht 1.6 m (5' 3\")   Wt 67.6 kg (149 lb)   SpO2 99%   BMI 26.39 kg/m   Estimated body mass index is 26.39 kg/m  as calculated from the following:    Height as of this encounter: 1.6 m (5' 3\").    Weight as of this encounter: 67.6 kg (149 lb). Body surface area is 1.73 meters squared.  No Pain (0) Comment: Data Unavailable   No LMP recorded. (Menstrual status: UNKNOWN).  Allergies reviewed: Yes  Medications reviewed: Yes    Medications: Medication refills not needed today.  Pharmacy name entered into [facility]:    [facility] [id number] [place] [facility] [place] - [address] [facility] [place] [address] AT PORTAL TO REGISTERED [facility]  [facility] [address] [place] [facility] [place] - [address] [facility] [place] - [address] [address]    Clinical concerns: none       Smitha Sun CMA              "

## 2019-06-28 ENCOUNTER — HOSPITAL ENCOUNTER (OUTPATIENT)
Dept: MRI IMAGING | Facility: CLINIC | Age: 49
Discharge: HOME | End: 2019-06-28
Attending: INTERNAL MEDICINE | Admitting: INTERNAL MEDICINE
Payer: COMMERCIAL

## 2019-06-28 DIAGNOSIS — C50.011 MALIGNANT NEOPLASM OF NIPPLE OF RIGHT BREAST IN FEMALE, UNSPECIFIED ESTROGEN RECEPTOR STATUS (H): ICD-10-CM

## 2019-06-28 PROCEDURE — A9585 GADOBUTROL INJECTION: HCPCS | Performed by: INTERNAL MEDICINE

## 2019-06-28 PROCEDURE — 25500064 ZZH RX 255 OP 636: Performed by: INTERNAL MEDICINE

## 2019-06-28 PROCEDURE — 77049 MRI BREAST C-+ W/CAD BI: CPT

## 2019-06-28 PROCEDURE — 25800025 ZZH RX 258: Performed by: INTERNAL MEDICINE

## 2019-06-28 RX ORDER — ACYCLOVIR 200 MG/1
60 CAPSULE ORAL ONCE
Status: COMPLETED | OUTPATIENT
Start: 2019-06-28 | End: 2019-06-28

## 2019-06-28 RX ORDER — GADOBUTROL 604.72 MG/ML
7 INJECTION INTRAVENOUS ONCE
Status: COMPLETED | OUTPATIENT
Start: 2019-06-28 | End: 2019-06-28

## 2019-06-28 RX ADMIN — GADOBUTROL 7 ML: 604.72 INJECTION INTRAVENOUS at 13:26

## 2019-06-28 RX ADMIN — SODIUM CHLORIDE 60 ML: 9 INJECTION, SOLUTION INTRAMUSCULAR; INTRAVENOUS; SUBCUTANEOUS at 13:27

## 2019-10-25 ENCOUNTER — ONCOLOGY VISIT (OUTPATIENT)
Dept: ONCOLOGY | Facility: CLINIC | Age: 49
End: 2019-10-25
Attending: INTERNAL MEDICINE
Payer: COMMERCIAL

## 2019-10-25 VITALS
BODY MASS INDEX: 26.75 KG/M2 | HEIGHT: 63 IN | HEART RATE: 68 BPM | DIASTOLIC BLOOD PRESSURE: 70 MMHG | WEIGHT: 151 LBS | SYSTOLIC BLOOD PRESSURE: 106 MMHG | RESPIRATION RATE: 16 BRPM | OXYGEN SATURATION: 98 %

## 2019-10-25 DIAGNOSIS — Z12.31 VISIT FOR SCREENING MAMMOGRAM: Primary | ICD-10-CM

## 2019-10-25 PROCEDURE — 99214 OFFICE O/P EST MOD 30 MIN: CPT | Performed by: INTERNAL MEDICINE

## 2019-10-25 PROCEDURE — G0463 HOSPITAL OUTPT CLINIC VISIT: HCPCS

## 2019-10-25 ASSESSMENT — MIFFLIN-ST. JEOR: SCORE: 1269.06

## 2019-10-25 ASSESSMENT — PAIN SCALES - GENERAL: PAINLEVEL: NO PAIN (0)

## 2019-10-25 NOTE — PROGRESS NOTES
[facility]    Hematology/Oncology Established Patient Follow-up Note      Today's Date: 10/25/2019    Reason for Follow-up:  right breast [id number] ER-/SD-, Her 2 + IDC, s/p lumpectomy s/p adjuvant TCHP x 5, completed radiation on 7/28/15, completed 1 year of Herceptin.    HISTORY OF PRESENT ILLNESS: Malu Benavides is a 49 year old female who presents for follow-up for her breast cancer.  She was previously a patient of Dr. Long, then Dr. Sterling.  She presented at age 44, her routine mammogram demonstrated an abnormality in the right breast. Biopsy done 12/17/2014 showed grade 3 infiltrating ductal adenocarcinoma. Estrogen and progesterone receptors were negative. HER-2 was positive by FISH with a ratio of 8.5 and staging evaluation for metastatic disease was negative. She experienced a right lumpectomy and sentinel node biopsy on 01/15/2015 demonstrating that the tumor measured 1.2 x 0.8 cm. It was ER/SD negative and HER-2 positive. The solitary sentinel node was negative for metastatic disease. The tumor was therefore staged as pT1c N0 M0, HER-2 positive breast cancer.   She did not have breast complaints on presentation.     Dr. Long recommend TCHP in adjuvant setting with Taxotere, carboplatin, Herceptin and pertuzumab. She had C1-C5 from 01/29/2015 to 4/24/2015. C6 chemo is d/c by Dr. Long due to severe neuropathy. She is advised on continue Herceptin to finish total 1 yr duration of Tx.     She completed radiation 6/5/15-7/28/15.    She completed 1 year of Herceptin in January 2016.    She underwent MISSY/BSO on 4/11/16.      INTERIM HISTORY:  Malu is here for her follow-up today.  She says that she feels well.  She denies any new symptoms today.  She denies new breast lumps/bumps or new pains.      REVIEW OF SYSTEMS:   14 point ROS was reviewed and is negative other than as noted above in HPI.     HOME MEDICATIONS:  Current Outpatient Medications   Medication Sig Dispense Refill      Acetaminophen (TYLENOL PO) Take 1,000 mg by mouth 2 times daily as needed for mild pain or fever       ACYCLOVIR PO Take 400 mg by mouth 5 times daily As needed for cold sores       FLUOXETINE HCL PO Take by mouth daily       ibuprofen (ADVIL,MOTRIN) 600 MG tablet Take 1 tablet (600 mg) by mouth every 6 hours as needed for moderate pain 120 tablet      propranolol (INDERAL) 40 MG tablet Take 40 mg by mouth 2 times daily        SUMAtriptan Succinate (IMITREX PO) Take 100 mg by mouth daily as needed for migraine (100 mg at onset of migraine and MRx1 prn)       Topiramate (TOPAMAX PO) Take 100 mg by mouth At Bedtime           ALLERGIES:  Allergies   Allergen Reactions     No Known Allergies          PAST MEDICAL HISTORY:  Past Medical History:   Diagnosis Date     Adjustment disorder with mixed anxiety and depressed mood 2009    anxiety initially with secondary depressive sx 2013     Breast cancer (H) 2015     Cervical cancer (H)      Chronic mixed headache syndrome     chronic daily headache and migraine     Dysthymic disorder 2013    Persistent anhedonia and fatigue     Former smoker     quit 2005, 8 pack year history     IBS (irritable bowel syndrome)     diarrhea predominate     Noninfectious ileitis      Papanicolaou smear of cervix with high grade squamous intraepithelial lesion (HGSIL) 2005    LEEP     Pregnancy induced hypertension     pregnancy induced hypertension     Recurrent herpes labialis     labialis     S/p small bowel obstruction     small bowel obstruction following appy, treated nonsurgically     Supervision of other normal pregnancy      - incomplete AB with suction curretage, C section for failure to progress     Tubulovillous adenoma of rectum 2013    2 cm tubullvillous adenoma with no dysplasia         PAST SURGICAL HISTORY:  Past Surgical History:   Procedure Laterality Date     BIOPSY BREAST SEED LOCALIZATION Right 2015    Procedure: BIOPSY BREAST SEED  LOCALIZATION;  Surgeon: Brant Townsend MD;  Location: [facility]     BREAST SURGERY       C APPENDECTOMY  1993     C EXPLORATORY OF ABDOMEN  1/2006    laparoscopy, mini laparotomy for drainage ovarian cyst, colonic adhesions     C/SECTION, LOW TRANSVERSE  1/2007    low segment transverse C section, extensive lysis of adhesions and repair of serosal bowel injuries     COLONOSCOPY  8/26/2013    2 mm polyp distal transverse colon(benign mucosa), 20 mm polyp recto-sigmoid colon(tubulovillous adenoma), repeat 3-5 years     CT SCAN ABDOMEN/PELVIS  8/2010    5-6 mm cyst caudate lobe of the liver, anteroverted uterus, 2.5x2.2 cm left adexal cyst     HC COLP CERVIX/UPPER VAGINA W LOOP ELEC BX CERVIX  1/2005     HC MRI BRAIN W/O CONTRAST  11/2004    paranasal sinus mucosal thickening, normal MRI brain       HYSTERECTOMY TOTAL ABDOMINAL, BILATERAL SALPINGO-OOPHORECTOMY, COMBINED N/A 4/11/2016    Procedure: COMBINED HYSTERECTOMY TOTAL ABDOMINAL, SALPINGO-OOPHORECTOMY;  Surgeon: Isamar Garza MD;  Location:  OR     OVERNIGHT OXIMETRY STUDY  2/2013    event index 1.9/hr, average O2 sat 96%, 16 sec with O2 sat < 88%, stable heart rate     SURGICAL HISTORY OF -   2004    suction curretage     SURGICAL HISTORY OF -   1/2007    Primary repair of multiple small bowel/sigmord colon serosal tears.         SOCIAL HISTORY:  Social History     Socioeconomic History     Marital status:      Spouse name: Mihai     Number of children: 1     Years of education: 14     Highest education level: Not on file   Occupational History     Occupation: quality tech     Employer: [employer]     Comment: [employer]   Social Needs     Financial resource strain: Not on file     Food insecurity:     Worry: Not on file     Inability: Not on file     Transportation needs:     Medical: Not on file     Non-medical: Not on file   Tobacco Use     Smoking status: Current Some Day Smoker     Packs/day: 0.50     Years: 15.00     Pack  years: 7.50     Types: Cigarettes     Last attempt to quit: 2013     Years since quittin.8     Smokeless tobacco: Never Used   Substance and Sexual Activity     Alcohol use: Yes     Comment: 2-4 beers per week     Drug use: No     Sexual activity: Yes     Partners: Male     Comment: same relationship since    Lifestyle     Physical activity:     Days per week: Not on file     Minutes per session: Not on file     Stress: Not on file   Relationships     Social connections:     Talks on phone: Not on file     Gets together: Not on file     Attends Tenriism service: Not on file     Active member of club or organization: Not on file     Attends meetings of clubs or organizations: Not on file     Relationship status: Not on file     Intimate partner violence:     Fear of current or ex partner: Not on file     Emotionally abused: Not on file     Physically abused: Not on file     Forced sexual activity: Not on file   Other Topics Concern      Service No     Blood Transfusions No     Caffeine Concern Yes     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 Not Asked   Social History Narrative    Lives with  and 6 year daughter.  Has two dogs.         FAMILY HISTORY:  Family History   Problem Relation Age of Onset     Kidney Cancer Father 59        renal cell carcinoma     Osteoporosis Father         related to cancer     Connective Tissue Disorder Mother         rheumatoid arthritis     Chronic Obstructive Pulmonary Disease Mother         COPD, smoker     Cardiovascular Mother         carotid endarterectomy,peripheral vascular disease, [place]  "amputation, smoker     Hypertension Mother      Osteoporosis Mother         prednisone, no fractures     Hypertension Brother      Hypertension Maternal Grandfather      Cervical Cancer Maternal Grandmother 30         at 56     Cerebrovascular Disease Paternal Grandfather      Neurologic Disorder Sister         headaches     Breast Cancer Cousin 51        two maternal cousins, diagnosed at 51 and 52         PHYSICAL EXAM:  Vital signs:  /70   Pulse 68   Resp 16   Ht 1.6 m (5' 3\")   Wt 68.5 kg (151 lb)   SpO2 98%   BMI 26.75 kg/m     ECO  GENERAL/CONSTITUTIONAL: No acute distress.  EYES: No scleral icterus.  LYMPH: No anterior cervical, posterior cervical, supraclavicular, or axillary adenopathy.   RESPIRATORY: Clear to auscultation bilaterally. No crackles or wheezing.   CARDIOVASCULAR: Regular rate and rhythm without murmurs, gallops, or rubs.  GASTROINTESTINAL: No tenderness. The patient has normal bowel sounds. No guarding.  No distention.  BREAST: Right-s/p right lumpectomy, scarring near the lumpectomy site at the 11 o'clock position; Left-no palpable mass, discharge, rash, or axillary lymphadenopathy.   MUSCULOSKELETAL: Warm and well-perfused, no cyanosis, clubbing, or edema.  NEUROLOGIC: Alert, oriented, answers questions appropriately.  INTEGUMENTARY: No rashes or jaundice.  GAIT: Steady, does not use assistive device      LABS:  None.      IMAGING:  Bilateral mammogram 18:  BI-RADS 2 - benign    MRI breast 19:  BI-RADS 2 - benign      ASSESSMENT/PLAN:  Malu Benavides is a 49 year old female with:    1) Right breast [id number] ER/UT-, Her 2 + IDC, s/p lumpectomy, adjuvant TCHP x 5. C6 Taxotere is not delivered due to neuropathy. She has completed 1 year of Herceptin.  She had ER/UT - negative disease, so she would not benefit from oral antihormone therapy.     She did see genetic counseling due to young age at diagnosis - showed a variant of uncertain significance in the BRCA2 " gene.  The significance of this is unknown with regards to her breast cancer recurrence risk and ovarian cancer risk.  She did have a baseline transvaginal ultrasound and CA-125 test that were negative.  She has no family history of ovarian cancer, but does have 2 maternal cousins with breast cancer.  She is concerned about ovarian cancer, and has undergone MISSY/BSO.  I discussed the case at our breast cancer tumor conference, and consensus was to undergo surveillance like high-risk breast cancer patient, with MRI breasts and mammograms alternating every 6 months.  She strongly desired to have her ovaries removed, in light of her indeterminate BRCA and history of breast cancer.    Ultimately, she underwent MISSY/BSO on 4/11/16.    MRI breast on 6/28/19 was benign.    -Bilateral mammogram in December 2019 - ordered  -RTC in 6 months for follow-up    2) Neuropathy: resolved. She has tapered off the gabapentin.     3) Pulmonary nodule on CT 12/2014. She is a former smoker, quit 2/2015. Repeat CT chest in July 2015 shows no evidence for metastatic disease.  The tiny nodule is thought most likely secondary to prominent vascular structure.    4) She had colonoscopy on 12/17/18, and 2 polyps were removed (sessile serrated adenoma and hyperplastic polyp).    -next colonoscopy was recommended for 5 years from then.    5) Migraines:  -she follows with her neurologist    6) She declines flu shot today.      I spent a total of 25 minutes with the patient, with over >50% of the time in counseling and/or coordination of care.      Brigitte Aguilar MD  Hematology/Oncology  [facility]

## 2019-12-27 ENCOUNTER — HOSPITAL ENCOUNTER (OUTPATIENT)
Dept: MAMMOGRAPHY | Facility: CLINIC | Age: 49
Discharge: HOME | End: 2019-12-27
Attending: INTERNAL MEDICINE | Admitting: INTERNAL MEDICINE
Payer: COMMERCIAL

## 2019-12-27 DIAGNOSIS — Z12.31 VISIT FOR SCREENING MAMMOGRAM: ICD-10-CM

## 2019-12-27 PROCEDURE — 77063 BREAST TOMOSYNTHESIS BI: CPT

## 2020-02-16 ENCOUNTER — HEALTH MAINTENANCE LETTER (OUTPATIENT)
Age: 50
End: 2020-02-16

## 2020-07-10 ENCOUNTER — ONCOLOGY VISIT (OUTPATIENT)
Dept: ONCOLOGY | Facility: CLINIC | Age: 50
End: 2020-07-10
Attending: INTERNAL MEDICINE
Payer: COMMERCIAL

## 2020-07-10 VITALS
OXYGEN SATURATION: 99 % | RESPIRATION RATE: 16 BRPM | DIASTOLIC BLOOD PRESSURE: 69 MMHG | SYSTOLIC BLOOD PRESSURE: 103 MMHG | BODY MASS INDEX: 27.11 KG/M2 | HEART RATE: 61 BPM | HEIGHT: 63 IN | TEMPERATURE: 98.5 F | WEIGHT: 153 LBS

## 2020-07-10 DIAGNOSIS — Z12.31 VISIT FOR SCREENING MAMMOGRAM: ICD-10-CM

## 2020-07-10 DIAGNOSIS — Z12.39 BREAST CANCER SCREENING: Primary | ICD-10-CM

## 2020-07-10 DIAGNOSIS — Z17.1 MALIGNANT NEOPLASM OF RIGHT BREAST IN FEMALE, ESTROGEN RECEPTOR NEGATIVE, UNSPECIFIED SITE OF BREAST (H): ICD-10-CM

## 2020-07-10 DIAGNOSIS — C50.911 MALIGNANT NEOPLASM OF RIGHT BREAST IN FEMALE, ESTROGEN RECEPTOR NEGATIVE, UNSPECIFIED SITE OF BREAST (H): ICD-10-CM

## 2020-07-10 PROCEDURE — 99214 OFFICE O/P EST MOD 30 MIN: CPT | Performed by: INTERNAL MEDICINE

## 2020-07-10 PROCEDURE — G0463 HOSPITAL OUTPT CLINIC VISIT: HCPCS

## 2020-07-10 ASSESSMENT — PAIN SCALES - GENERAL: PAINLEVEL: NO PAIN (0)

## 2020-07-10 ASSESSMENT — MIFFLIN-ST. JEOR: SCORE: 1273.13

## 2020-07-10 NOTE — PROGRESS NOTES
[facility]    Hematology/Oncology Established Patient Follow-up Note      Today's Date: 7/10/2020    Reason for Follow-up:  right breast [id number] ER-/AZ-, Her 2 + IDC, s/p lumpectomy s/p adjuvant TCHP x 5, completed radiation on 7/28/15, completed 1 year of Herceptin.    HISTORY OF PRESENT ILLNESS: Malu Benavides is a 50 year old female who presents for follow-up for her breast cancer.  She was previously a patient of Dr. Long, then Dr. Sterling.  She presented at age 44, her routine mammogram demonstrated an abnormality in the right breast. Biopsy done 12/17/2014 showed grade 3 infiltrating ductal adenocarcinoma. Estrogen and progesterone receptors were negative. HER-2 was positive by FISH with a ratio of 8.5 and staging evaluation for metastatic disease was negative. She experienced a right lumpectomy and sentinel node biopsy on 01/15/2015 demonstrating that the tumor measured 1.2 x 0.8 cm. It was ER/AZ negative and HER-2 positive. The solitary sentinel node was negative for metastatic disease. The tumor was therefore staged as pT1c N0 M0, HER-2 positive breast cancer.   She did not have breast complaints on presentation.     Dr. Long recommend TCHP in adjuvant setting with Taxotere, carboplatin, Herceptin and pertuzumab. She had C1-C5 from 01/29/2015 to 4/24/2015. C6 chemo is d/c by Dr. Long due to severe neuropathy. She is advised on continue Herceptin to finish total 1 yr duration of Tx.     She completed radiation 6/5/15-7/28/15.    She completed 1 year of Herceptin in January 2016.    She underwent MISSY/BSO on 4/11/16.      INTERIM HISTORY:  Malu is here for her follow-up today.  She is feeling well.  She denies any new health concerns.  She denies any new breast lumps/bumps or new pains.      REVIEW OF SYSTEMS:   14 point ROS was reviewed and is negative other than as noted above in HPI.     HOME MEDICATIONS:  Current Outpatient Medications   Medication Sig Dispense Refill      Acetaminophen (TYLENOL PO) Take 1,000 mg by mouth 2 times daily as needed for mild pain or fever       ACYCLOVIR PO Take 400 mg by mouth 5 times daily As needed for cold sores       FLUOXETINE HCL PO Take by mouth daily       ibuprofen (ADVIL,MOTRIN) 600 MG tablet Take 1 tablet (600 mg) by mouth every 6 hours as needed for moderate pain 120 tablet      propranolol (INDERAL) 40 MG tablet Take 40 mg by mouth 2 times daily        SUMAtriptan Succinate (IMITREX PO) Take 100 mg by mouth daily as needed for migraine (100 mg at onset of migraine and MRx1 prn)       Topiramate (TOPAMAX PO) Take 100 mg by mouth At Bedtime           ALLERGIES:  Allergies   Allergen Reactions     No Known Allergies          PAST MEDICAL HISTORY:  Past Medical History:   Diagnosis Date     Adjustment disorder with mixed anxiety and depressed mood 2009    anxiety initially with secondary depressive sx 2013     Breast cancer (H) 2015     Cervical cancer (H)      Chronic mixed headache syndrome     chronic daily headache and migraine     Dysthymic disorder 2013    Persistent anhedonia and fatigue     Former smoker     quit 2005, 8 pack year history     IBS (irritable bowel syndrome)     diarrhea predominate     Noninfectious ileitis      Papanicolaou smear of cervix with high grade squamous intraepithelial lesion (HGSIL) 2005    LEEP     Pregnancy induced hypertension     pregnancy induced hypertension     Recurrent herpes labialis     labialis     S/p small bowel obstruction     small bowel obstruction following appy, treated nonsurgically     Supervision of other normal pregnancy      - incomplete AB with suction curretage, C section for failure to progress     Tubulovillous adenoma of rectum 2013    2 cm tubullvillous adenoma with no dysplasia         PAST SURGICAL HISTORY:  Past Surgical History:   Procedure Laterality Date     BIOPSY BREAST SEED LOCALIZATION Right 2015    Procedure: BIOPSY BREAST SEED  LOCALIZATION;  Surgeon: Brant Townsend MD;  Location: [facility]     BREAST SURGERY       C APPENDECTOMY  1993     C EXPLORATORY OF ABDOMEN  1/2006    laparoscopy, mini laparotomy for drainage ovarian cyst, colonic adhesions     C/SECTION, LOW TRANSVERSE  1/2007    low segment transverse C section, extensive lysis of adhesions and repair of serosal bowel injuries     COLONOSCOPY  8/26/2013    2 mm polyp distal transverse colon(benign mucosa), 20 mm polyp recto-sigmoid colon(tubulovillous adenoma), repeat 3-5 years     CT SCAN ABDOMEN/PELVIS  8/2010    5-6 mm cyst caudate lobe of the liver, anteroverted uterus, 2.5x2.2 cm left adexal cyst     HC COLP CERVIX/UPPER VAGINA W LOOP ELEC BX CERVIX  1/2005     HC MRI BRAIN W/O CONTRAST  11/2004    paranasal sinus mucosal thickening, normal MRI brain       HYSTERECTOMY TOTAL ABDOMINAL, BILATERAL SALPINGO-OOPHORECTOMY, COMBINED N/A 4/11/2016    Procedure: COMBINED HYSTERECTOMY TOTAL ABDOMINAL, SALPINGO-OOPHORECTOMY;  Surgeon: Isamar Garza MD;  Location:  OR     OVERNIGHT OXIMETRY STUDY  2/2013    event index 1.9/hr, average O2 sat 96%, 16 sec with O2 sat < 88%, stable heart rate     SURGICAL HISTORY OF -   2004    suction curretage     SURGICAL HISTORY OF -   1/2007    Primary repair of multiple small bowel/sigmord colon serosal tears.         SOCIAL HISTORY:  Social History     Socioeconomic History     Marital status:      Spouse name: Mihai     Number of children: 1     Years of education: 14     Highest education level: Not on file   Occupational History     Occupation: quality tech     Employer: [employer]     Comment: [employer]   Social Needs     Financial resource strain: Not on file     Food insecurity     Worry: Not on file     Inability: Not on file     Transportation needs     Medical: Not on file     Non-medical: Not on file   Tobacco Use     Smoking status: Current Some Day Smoker     Packs/day: 0.50     Years: 15.00     Pack  years: 7.50     Types: Cigarettes     Last attempt to quit: 2013     Years since quittin.5     Smokeless tobacco: Never Used   Substance and Sexual Activity     Alcohol use: Yes     Comment: 2-4 beers per week     Drug use: No     Sexual activity: Yes     Partners: Male     Comment: same relationship since    Lifestyle     Physical activity     Days per week: Not on file     Minutes per session: Not on file     Stress: Not on file   Relationships     Social connections     Talks on phone: Not on file     Gets together: Not on file     Attends Nondenominational service: Not on file     Active member of club or organization: Not on file     Attends meetings of clubs or organizations: Not on file     Relationship status: Not on file     Intimate partner violence     Fear of current or ex partner: Not on file     Emotionally abused: Not on file     Physically abused: Not on file     Forced sexual activity: Not on file   Other Topics Concern      Service No     Blood Transfusions No     Caffeine Concern Yes     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 Not Asked   Social History Narrative    Lives with  and 6 year daughter.  Has two dogs.         FAMILY HISTORY:  Family History   Problem Relation Age of Onset     Kidney Cancer Father 59        renal cell carcinoma     Osteoporosis Father         related to cancer     Connective Tissue Disorder Mother         rheumatoid arthritis     Chronic Obstructive Pulmonary Disease Mother         COPD, smoker     Cardiovascular Mother         carotid endarterectomy,peripheral vascular disease, [place]  "amputation, smoker     Hypertension Mother      Osteoporosis Mother         prednisone, no fractures     Hypertension Brother      Hypertension Maternal Grandfather      Cervical Cancer Maternal Grandmother 30         at 56     Cerebrovascular Disease Paternal Grandfather      Neurologic Disorder Sister         headaches     Breast Cancer Cousin 51        two maternal cousins, diagnosed at 51 and 52         PHYSICAL EXAM:  Vital signs:  /69   Pulse 61   Temp 98.5  F (36.9  C) (Oral)   Resp 16   Ht 1.6 m (5' 3\")   Wt 69.4 kg (153 lb)   SpO2 99%   BMI 27.10 kg/m     ECO  GENERAL/CONSTITUTIONAL: No acute distress.  EYES: No scleral icterus.  LYMPH: No anterior cervical, posterior cervical, supraclavicular, or axillary adenopathy.   RESPIRATORY: Clear to auscultation bilaterally. No crackles or wheezing.   CARDIOVASCULAR: Regular rate and rhythm without murmurs, gallops, or rubs.  GASTROINTESTINAL: No tenderness. The patient has normal bowel sounds. No guarding.  No distention.  BREAST: Right-s/p right lumpectomy, scarring near the lumpectomy site at the 11 o'clock position; Left-no palpable mass, discharge, rash, or axillary lymphadenopathy.   MUSCULOSKELETAL: Warm and well-perfused, no cyanosis, clubbing, or edema.  NEUROLOGIC: Alert, oriented, answers questions appropriately.  INTEGUMENTARY: No rashes or jaundice.  Small sunburn above the left breast.  GAIT: Steady, does not use assistive device      LABS:  None.      IMAGING:  MRI breast 19:  BI-RADS 2 - benign    Bilateral mammogram 19:  Breast conservation therapy changes again seen on the RIGHT.   No concerning findings.       ASSESSMENT/PLAN:  Malu Benavides is a 50 year old female with:    1) Right breast [id number] ER/NH-, Her 2 + IDC, s/p lumpectomy, adjuvant TCHP x 5. C6 Taxotere is not delivered due to neuropathy. She has completed 1 year of Herceptin.  She had ER/NH - negative disease, so she would not benefit from oral " antihormone therapy.     She did see genetic counseling due to young age at diagnosis - showed a variant of uncertain significance in the BRCA2 gene.  The significance of this is unknown with regards to her breast cancer recurrence risk and ovarian cancer risk.  She did have a baseline transvaginal ultrasound and CA-125 test that were negative.  She has no family history of ovarian cancer, but does have 2 maternal cousins with breast cancer.  She is concerned about ovarian cancer, and has undergone MISSY/BSO.  I discussed the case at our breast cancer tumor conference, and consensus was to undergo surveillance like high-risk breast cancer patient, with MRI breasts and mammograms alternating every 6 months.  She strongly desired to have her ovaries removed, in light of her indeterminate BRCA and history of breast cancer.    Ultimately, she underwent MISSY/BSO on 4/11/16.    Mammogram in December 2019 was benign.    -MRI breast this month - ordered  -Bilateral mammogram in December 2020 - ordered  -RTC in 6 months for follow-up    2) Neuropathy: resolved. She has tapered off the gabapentin.     3) Pulmonary nodule on CT 12/2014. She is a former smoker, quit 2/2015. Repeat CT chest in July 2015 shows no evidence for metastatic disease.  The tiny nodule is thought most likely secondary to prominent vascular structure.    4) She had colonoscopy on 12/17/18, and 2 polyps were removed (sessile serrated adenoma and hyperplastic polyp).    -next colonoscopy was recommended for 5 years from then.    5) Migraines:  -she follows with her neurologist    6) Moles: She has a sunburn above her left breast.    -advised diligent sunscreen use; regular skin checks with her PCP or dermatologist      I spent a total of 25 minutes with the patient, with over >50% of the time in counseling and/or coordination of care.      Brigitte Aguilar MD  Hematology/Oncology  [facility]

## 2020-07-10 NOTE — Clinical Note
7/10/2020         RE: Malu Benavides  [address] [place] [address]        Dear Colleague,    Thank you for referring your patient, Malu Benavides, to the [facility]. Please see a copy of my visit note below.    [facility]    Hematology/Oncology Established Patient Follow-up Note      Today's Date: 7/10/2020    Reason for Follow-up:  right breast [id number] ER-/OR-, Her 2 + IDC, s/p lumpectomy s/p adjuvant TCHP x 5, completed radiation on 7/28/15, completed 1 year of Herceptin.    HISTORY OF PRESENT ILLNESS: Malu Benavides is a 50 year old female who presents for follow-up for her breast cancer.  She was previously a patient of Dr. Long, then Dr. Sterling.  She presented at age 44, her routine mammogram demonstrated an abnormality in the right breast. Biopsy done 12/17/2014 showed grade 3 infiltrating ductal adenocarcinoma. Estrogen and progesterone receptors were negative. HER-2 was positive by FISH with a ratio of 8.5 and staging evaluation for metastatic disease was negative. She experienced a right lumpectomy and sentinel node biopsy on 01/15/2015 demonstrating that the tumor measured 1.2 x 0.8 cm. It was ER/OR negative and HER-2 positive. The solitary sentinel node was negative for metastatic disease. The tumor was therefore staged as pT1c N0 M0, HER-2 positive breast cancer.   She did not have breast complaints on presentation.     Dr. Long recommend TCHP in adjuvant setting with Taxotere, carboplatin, Herceptin and pertuzumab. She had C1-C5 from 01/29/2015 to 4/24/2015. C6 chemo is d/c by Dr. Long due to severe neuropathy. She is advised on continue Herceptin to finish total 1 yr duration of Tx.     She completed radiation 6/5/15-7/28/15.    She completed 1 year of Herceptin in January 2016.    She underwent MISSY/BSO on 4/11/16.      INTERIM HISTORY:  Malu is here for her follow-up today.  She is feeling well.  She denies any new health concerns.   She denies any new breast lumps/bumps or new pains.      REVIEW OF SYSTEMS:   14 point ROS was reviewed and is negative other than as noted above in HPI.     HOME MEDICATIONS:  Current Outpatient Medications   Medication Sig Dispense Refill     Acetaminophen (TYLENOL PO) Take 1,000 mg by mouth 2 times daily as needed for mild pain or fever       ACYCLOVIR PO Take 400 mg by mouth 5 times daily As needed for cold sores       FLUOXETINE HCL PO Take by mouth daily       ibuprofen (ADVIL,MOTRIN) 600 MG tablet Take 1 tablet (600 mg) by mouth every 6 hours as needed for moderate pain 120 tablet      propranolol (INDERAL) 40 MG tablet Take 40 mg by mouth 2 times daily        SUMAtriptan Succinate (IMITREX PO) Take 100 mg by mouth daily as needed for migraine (100 mg at onset of migraine and MRx1 prn)       Topiramate (TOPAMAX PO) Take 100 mg by mouth At Bedtime           ALLERGIES:  Allergies   Allergen Reactions     No Known Allergies          PAST MEDICAL HISTORY:  Past Medical History:   Diagnosis Date     Adjustment disorder with mixed anxiety and depressed mood     anxiety initially with secondary depressive sx 2013     Breast cancer (H) 2015     Cervical cancer (H)      Chronic mixed headache syndrome     chronic daily headache and migraine     Dysthymic disorder 2013    Persistent anhedonia and fatigue     Former smoker     quit 2005, 8 pack year history     IBS (irritable bowel syndrome)     diarrhea predominate     Noninfectious ileitis      Papanicolaou smear of cervix with high grade squamous intraepithelial lesion (HGSIL) 2005    LEEP     Pregnancy induced hypertension     pregnancy induced hypertension     Recurrent herpes labialis     labialis     S/p small bowel obstruction     small bowel obstruction following appy, treated nonsurgically     Supervision of other normal pregnancy      - incomplete AB with suction curretage, C section for failure to progress     Tubulovillous adenoma  of rectum 8/2013    2 cm tubullvillous adenoma with no dysplasia         PAST SURGICAL HISTORY:  Past Surgical History:   Procedure Laterality Date     BIOPSY BREAST SEED LOCALIZATION Right 1/14/2015    Procedure: BIOPSY BREAST SEED LOCALIZATION;  Surgeon: Brant Townsend MD;  Location: [facility]     BREAST SURGERY       C APPENDECTOMY  1993     C EXPLORATORY OF ABDOMEN  1/2006    laparoscopy, mini laparotomy for drainage ovarian cyst, colonic adhesions     C/SECTION, LOW TRANSVERSE  1/2007    low segment transverse C section, extensive lysis of adhesions and repair of serosal bowel injuries     COLONOSCOPY  8/26/2013    2 mm polyp distal transverse colon(benign mucosa), 20 mm polyp recto-sigmoid colon(tubulovillous adenoma), repeat 3-5 years     CT SCAN ABDOMEN/PELVIS  8/2010    5-6 mm cyst caudate lobe of the liver, anteroverted uterus, 2.5x2.2 cm left adexal cyst     HC COLP CERVIX/UPPER VAGINA W LOOP ELEC BX CERVIX  1/2005     HC MRI BRAIN W/O CONTRAST  11/2004    paranasal sinus mucosal thickening, normal MRI brain       HYSTERECTOMY TOTAL ABDOMINAL, BILATERAL SALPINGO-OOPHORECTOMY, COMBINED N/A 4/11/2016    Procedure: COMBINED HYSTERECTOMY TOTAL ABDOMINAL, SALPINGO-OOPHORECTOMY;  Surgeon: Isamar Garza MD;  Location:  OR     OVERNIGHT OXIMETRY STUDY  2/2013    event index 1.9/hr, average O2 sat 96%, 16 sec with O2 sat < 88%, stable heart rate     SURGICAL HISTORY OF -   2004    suction curretage     SURGICAL HISTORY OF -   1/2007    Primary repair of multiple small bowel/sigmord colon serosal tears.         SOCIAL HISTORY:  Social History     Socioeconomic History     Marital status:      Spouse name: Mihai     Number of children: 1     Years of education: 14     Highest education level: Not on file   Occupational History     Occupation: quality tech     Employer: [employer]     Comment: [employer]   Social Needs     Financial resource strain: Not on file     Food insecurity      Worry: Not on file     Inability: Not on file     Transportation needs     Medical: Not on file     Non-medical: Not on file   Tobacco Use     Smoking status: Current Some Day Smoker     Packs/day: 0.50     Years: 15.00     Pack years: 7.50     Types: Cigarettes     Last attempt to quit: 2013     Years since quittin.5     Smokeless tobacco: Never Used   Substance and Sexual Activity     Alcohol use: Yes     Comment: 2-4 beers per week     Drug use: No     Sexual activity: Yes     Partners: Male     Comment: same relationship since    Lifestyle     Physical activity     Days per week: Not on file     Minutes per session: Not on file     Stress: Not on file   Relationships     Social connections     Talks on phone: Not on file     Gets together: Not on file     Attends Anabaptist service: Not on file     Active member of club or organization: Not on file     Attends meetings of clubs or organizations: Not on file     Relationship status: Not on file     Intimate partner violence     Fear of current or ex partner: Not on file     Emotionally abused: Not on file     Physically abused: Not on file     Forced sexual activity: Not on file   Other Topics Concern      Service No     Blood Transfusions No     Caffeine Concern Yes     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 Not Asked   Social History Narrative    Lives with  and 6 year daughter.  Has two dogs.         FAMILY HISTORY:  Family History   Problem Relation Age of Onset     Kidney Cancer Father 59        renal cell carcinoma     Osteoporosis Father         related to cancer      "Connective Tissue Disorder Mother         rheumatoid arthritis     Chronic Obstructive Pulmonary Disease Mother         COPD, smoker     Cardiovascular Mother         carotid endarterectomy,peripheral vascular disease, [place] amputation, smoker     Hypertension Mother      Osteoporosis Mother         prednisone, no fractures     Hypertension Brother      Hypertension Maternal Grandfather      Cervical Cancer Maternal Grandmother 30         at 56     Cerebrovascular Disease Paternal Grandfather      Neurologic Disorder Sister         headaches     Breast Cancer Cousin 51        two maternal cousins, diagnosed at 51 and 52         PHYSICAL EXAM:  Vital signs:  /69   Pulse 61   Temp 98.5  F (36.9  C) (Oral)   Resp 16   Ht 1.6 m (5' 3\")   Wt 69.4 kg (153 lb)   SpO2 99%   BMI 27.10 kg/m     ECO  GENERAL/CONSTITUTIONAL: No acute distress.  EYES: No scleral icterus.  LYMPH: No anterior cervical, posterior cervical, supraclavicular, or axillary adenopathy.   RESPIRATORY: Clear to auscultation bilaterally. No crackles or wheezing.   CARDIOVASCULAR: Regular rate and rhythm without murmurs, gallops, or rubs.  GASTROINTESTINAL: No tenderness. The patient has normal bowel sounds. No guarding.  No distention.  BREAST: Right-s/p right lumpectomy, scarring near the lumpectomy site at the 11 o'clock position; Left-no palpable mass, discharge, rash, or axillary lymphadenopathy.   MUSCULOSKELETAL: Warm and well-perfused, no cyanosis, clubbing, or edema.  NEUROLOGIC: Alert, oriented, answers questions appropriately.  INTEGUMENTARY: No rashes or jaundice.  Small sunburn above the left breast.  GAIT: Steady, does not use assistive device      LABS:  None.      IMAGING:  MRI breast 19:  BI-RADS 2 - benign    Bilateral mammogram 19:  Breast conservation therapy changes again seen on the RIGHT.   No concerning findings.       ASSESSMENT/PLAN:  Malu Benavides is a 50 year old female with:    1) Right breast " [id number] ER/NC-, Her 2 + IDC, s/p lumpectomy, adjuvant TCHP x 5. C6 Taxotere is not delivered due to neuropathy. She has completed 1 year of Herceptin.  She had ER/NC - negative disease, so she would not benefit from oral antihormone therapy.     She did see genetic counseling due to young age at diagnosis - showed a variant of uncertain significance in the BRCA2 gene.  The significance of this is unknown with regards to her breast cancer recurrence risk and ovarian cancer risk.  She did have a baseline transvaginal ultrasound and CA-125 test that were negative.  She has no family history of ovarian cancer, but does have 2 maternal cousins with breast cancer.  She is concerned about ovarian cancer, and has undergone MISSY/BSO.  I discussed the case at our breast cancer tumor conference, and consensus was to undergo surveillance like high-risk breast cancer patient, with MRI breasts and mammograms alternating every 6 months.  She strongly desired to have her ovaries removed, in light of her indeterminate BRCA and history of breast cancer.    Ultimately, she underwent MISSY/BSO on 4/11/16.    Mammogram in December 2019 was benign.    -MRI breast this month - ordered  -Bilateral mammogram in December 2020 - ordered  -RTC in 6 months for follow-up    2) Neuropathy: resolved. She has tapered off the gabapentin.     3) Pulmonary nodule on CT 12/2014. She is a former smoker, quit 2/2015. Repeat CT chest in July 2015 shows no evidence for metastatic disease.  The tiny nodule is thought most likely secondary to prominent vascular structure.    4) She had colonoscopy on 12/17/18, and 2 polyps were removed (sessile serrated adenoma and hyperplastic polyp).    -next colonoscopy was recommended for 5 years from then.    5) Migraines:  -she follows with her neurologist    6) Moles: She has a sunburn above her left breast.    -advised diligent sunscreen use; regular skin checks with her PCP or dermatologist      I spent a total of 25  minutes with the patient, with over >50% of the time in counseling and/or coordination of care.      Brigitte Aguilar MD  Hematology/Oncology  [facility]          Again, thank you for allowing me to participate in the care of your patient.        Sincerely,        Brigitte Aguilar MD

## 2020-07-31 ENCOUNTER — HOSPITAL ENCOUNTER (OUTPATIENT)
Dept: MRI IMAGING | Facility: CLINIC | Age: 50
Discharge: HOME | End: 2020-07-31
Attending: INTERNAL MEDICINE | Admitting: INTERNAL MEDICINE
Payer: COMMERCIAL

## 2020-07-31 ENCOUNTER — TELEPHONE (OUTPATIENT)
Dept: MAMMOGRAPHY | Facility: CLINIC | Age: 50
End: 2020-07-31

## 2020-07-31 DIAGNOSIS — Z17.1 MALIGNANT NEOPLASM OF RIGHT BREAST IN FEMALE, ESTROGEN RECEPTOR NEGATIVE, UNSPECIFIED SITE OF BREAST (H): ICD-10-CM

## 2020-07-31 DIAGNOSIS — C50.911 MALIGNANT NEOPLASM OF RIGHT BREAST IN FEMALE, ESTROGEN RECEPTOR NEGATIVE, UNSPECIFIED SITE OF BREAST (H): ICD-10-CM

## 2020-07-31 DIAGNOSIS — Z12.39 BREAST CANCER SCREENING: ICD-10-CM

## 2020-07-31 PROCEDURE — 77049 MRI BREAST C-+ W/CAD BI: CPT

## 2020-07-31 PROCEDURE — 25500064 ZZH RX 255 OP 636: Performed by: INTERNAL MEDICINE

## 2020-07-31 PROCEDURE — A9585 GADOBUTROL INJECTION: HCPCS | Performed by: INTERNAL MEDICINE

## 2020-07-31 RX ORDER — GADOBUTROL 604.72 MG/ML
7 INJECTION INTRAVENOUS ONCE
Status: COMPLETED | OUTPATIENT
Start: 2020-07-31 | End: 2020-07-31

## 2020-07-31 RX ADMIN — GADOBUTROL 7 ML: 604.72 INJECTION INTRAVENOUS at 12:24

## 2020-07-31 NOTE — TELEPHONE ENCOUNTER
Breast MRI:    I phoned Ms. Malu Benavides, confirmed her full name, date of birth and notified her of benign Bilateral Breast MRI (7/31/20) results below.    Informed patient our breast radiologists recommendations for annual screening mammogram, which she will be due after 12/28/2020, and to speak with the ordering provider regarding future breast MRIs.  I will forward this note to ordering provider.    I advised patient to contact her primary provider if any breast issues or symptoms arise.  Patient verbalized understanding and agrees with the plan of care.  Elvia Munguia RN, BSN    Elvia Munguia, RN BSN  Breast Care Nurse Coordinator  [facility]  [phone]    Study Result     MRI BREASTS, BILATERAL, ENHANCED - 7/31/2020     HISTORY: Right breast cancer in 2014 treated with breast conserving  therapy. No current breast complaints.     COMPARISON: Breast MRIs: 6/28/2019, 6/8/2018, 6/2/2017. Screening  mammogram, 12/27/2019.       FINDINGS:      Right Breast: Mild background parenchymal enhancement. No suspicious  enhancement to suggest malignancy. Stable post therapy change. No  adenopathy.      Left Breast: Mild background parenchymal enhancement. No suspicious  enhancement to suggest malignancy. No adenopathy.                                                                      IMPRESSION: BI-RADS CATEGORY: 2 - Benign Finding(s).  No MRI evidence of malignancy.     RECOMMENDED FOLLOW-UP: Annual Mammography.     ALEJANDRO QUINTANA MD

## 2021-01-08 ENCOUNTER — HOSPITAL ENCOUNTER (OUTPATIENT)
Dept: MAMMOGRAPHY | Facility: CLINIC | Age: 51
Discharge: HOME | End: 2021-01-08
Attending: INTERNAL MEDICINE | Admitting: INTERNAL MEDICINE
Payer: COMMERCIAL

## 2021-01-08 DIAGNOSIS — Z12.31 VISIT FOR SCREENING MAMMOGRAM: ICD-10-CM

## 2021-01-08 PROCEDURE — 77063 BREAST TOMOSYNTHESIS BI: CPT

## 2021-01-15 ENCOUNTER — ONCOLOGY VISIT (OUTPATIENT)
Dept: ONCOLOGY | Facility: CLINIC | Age: 51
End: 2021-01-15
Attending: INTERNAL MEDICINE
Payer: COMMERCIAL

## 2021-01-15 VITALS
RESPIRATION RATE: 16 BRPM | WEIGHT: 158 LBS | OXYGEN SATURATION: 99 % | BODY MASS INDEX: 28 KG/M2 | SYSTOLIC BLOOD PRESSURE: 111 MMHG | DIASTOLIC BLOOD PRESSURE: 58 MMHG | HEART RATE: 67 BPM | TEMPERATURE: 98.3 F | HEIGHT: 63 IN

## 2021-01-15 DIAGNOSIS — C50.911 MALIGNANT NEOPLASM OF RIGHT BREAST IN FEMALE, ESTROGEN RECEPTOR NEGATIVE, UNSPECIFIED SITE OF BREAST (H): Primary | ICD-10-CM

## 2021-01-15 DIAGNOSIS — Z17.1 MALIGNANT NEOPLASM OF RIGHT BREAST IN FEMALE, ESTROGEN RECEPTOR NEGATIVE, UNSPECIFIED SITE OF BREAST (H): Primary | ICD-10-CM

## 2021-01-15 PROCEDURE — 99213 OFFICE O/P EST LOW 20 MIN: CPT | Performed by: INTERNAL MEDICINE

## 2021-01-15 PROCEDURE — G0463 HOSPITAL OUTPT CLINIC VISIT: HCPCS

## 2021-01-15 RX ORDER — NORTRIPTYLINE HCL 10 MG
CAPSULE ORAL
COMMUNITY
Start: 2020-12-16

## 2021-01-15 ASSESSMENT — MIFFLIN-ST. JEOR: SCORE: 1295.81

## 2021-01-15 ASSESSMENT — PAIN SCALES - GENERAL: PAINLEVEL: NO PAIN (0)

## 2021-01-15 NOTE — PROGRESS NOTES
[facility]    Hematology/Oncology Established Patient Follow-up Note      Today's Date: 1/15/2021    Reason for Follow-up:  right breast [id number] ER-/NM-, Her 2 + IDC, s/p lumpectomy s/p adjuvant TCHP x 5, completed radiation on 7/28/15, completed 1 year of Herceptin.    HISTORY OF PRESENT ILLNESS: Malu Benavides is a 50 year old female who presents for follow-up for her breast cancer.  She was previously a patient of Dr. Long, then Dr. Sterling.  She presented at age 44, her routine mammogram demonstrated an abnormality in the right breast. Biopsy done 12/17/2014 showed grade 3 infiltrating ductal adenocarcinoma. Estrogen and progesterone receptors were negative. HER-2 was positive by FISH with a ratio of 8.5 and staging evaluation for metastatic disease was negative. She experienced a right lumpectomy and sentinel node biopsy on 01/15/2015 demonstrating that the tumor measured 1.2 x 0.8 cm. It was ER/NM negative and HER-2 positive. The solitary sentinel node was negative for metastatic disease. The tumor was therefore staged as pT1c N0 M0, HER-2 positive breast cancer.   She did not have breast complaints on presentation.     Dr. Long recommend TCHP in adjuvant setting with Taxotere, carboplatin, Herceptin and pertuzumab. She had C1-C5 from 01/29/2015 to 4/24/2015. C6 chemo is d/c by Dr. Long due to severe neuropathy. She is advised on continue Herceptin to finish total 1 yr duration of Tx.     She completed radiation 6/5/15-7/28/15.    She completed 1 year of Herceptin in January 2016.    She underwent MISSY/BSO on 4/11/16.      INTERIM HISTORY:  Idania is feeling well.  She notes back pain, but she saw her PCP and had x-rays done, which showed degenerative disease.        REVIEW OF SYSTEMS:   14 point ROS was reviewed and is negative other than as noted above in HPI.     HOME MEDICATIONS:  Current Outpatient Medications   Medication Sig Dispense Refill     Acetaminophen (TYLENOL PO) Take  1,000 mg by mouth 2 times daily as needed for mild pain or fever       ACYCLOVIR PO Take 400 mg by mouth 5 times daily As needed for cold sores       FLUOXETINE HCL PO Take by mouth daily       ibuprofen (ADVIL,MOTRIN) 600 MG tablet Take 1 tablet (600 mg) by mouth every 6 hours as needed for moderate pain 120 tablet      nortriptyline (PAMELOR) 10 MG capsule        propranolol (INDERAL) 40 MG tablet Take 40 mg by mouth 2 times daily        SUMAtriptan Succinate (IMITREX PO) Take 100 mg by mouth daily as needed for migraine (100 mg at onset of migraine and MRx1 prn)       Topiramate (TOPAMAX PO) Take 100 mg by mouth At Bedtime           ALLERGIES:  Allergies   Allergen Reactions     No Known Allergies          PAST MEDICAL HISTORY:  Past Medical History:   Diagnosis Date     Adjustment disorder with mixed anxiety and depressed mood     anxiety initially with secondary depressive sx 2013     Breast cancer (H) 2015     Cervical cancer (H)      Chronic mixed headache syndrome     chronic daily headache and migraine     Dysthymic disorder 2013    Persistent anhedonia and fatigue     Former smoker     quit 2005, 8 pack year history     IBS (irritable bowel syndrome)     diarrhea predominate     Noninfectious ileitis      Papanicolaou smear of cervix with high grade squamous intraepithelial lesion (HGSIL) 2005    LEEP     Pregnancy induced hypertension     pregnancy induced hypertension     Recurrent herpes labialis     labialis     S/p small bowel obstruction     small bowel obstruction following appy, treated nonsurgically     Supervision of other normal pregnancy      - incomplete AB with suction curretage, C section for failure to progress     Tubulovillous adenoma of rectum 2013    2 cm tubullvillous adenoma with no dysplasia         PAST SURGICAL HISTORY:  Past Surgical History:   Procedure Laterality Date     BIOPSY BREAST SEED LOCALIZATION Right 2015    Procedure: BIOPSY BREAST SEED  LOCALIZATION;  Surgeon: Brant Townsend MD;  Location: [facility]     BREAST SURGERY       C APPENDECTOMY  1993     C EXPLORATORY OF ABDOMEN  1/2006    laparoscopy, mini laparotomy for drainage ovarian cyst, colonic adhesions     C/SECTION, LOW TRANSVERSE  1/2007    low segment transverse C section, extensive lysis of adhesions and repair of serosal bowel injuries     COLONOSCOPY  8/26/2013    2 mm polyp distal transverse colon(benign mucosa), 20 mm polyp recto-sigmoid colon(tubulovillous adenoma), repeat 3-5 years     CT SCAN ABDOMEN/PELVIS  8/2010    5-6 mm cyst caudate lobe of the liver, anteroverted uterus, 2.5x2.2 cm left adexal cyst     HC COLP CERVIX/UPPER VAGINA W LOOP ELEC BX CERVIX  1/2005     HC MRI BRAIN W/O CONTRAST  11/2004    paranasal sinus mucosal thickening, normal MRI brain       HYSTERECTOMY TOTAL ABDOMINAL, BILATERAL SALPINGO-OOPHORECTOMY, COMBINED N/A 4/11/2016    Procedure: COMBINED HYSTERECTOMY TOTAL ABDOMINAL, SALPINGO-OOPHORECTOMY;  Surgeon: Isamar Garza MD;  Location:  OR     OVERNIGHT OXIMETRY STUDY  2/2013    event index 1.9/hr, average O2 sat 96%, 16 sec with O2 sat < 88%, stable heart rate     SURGICAL HISTORY OF -   2004    suction curretage     SURGICAL HISTORY OF -   1/2007    Primary repair of multiple small bowel/sigmord colon serosal tears.         SOCIAL HISTORY:  Social History     Socioeconomic History     Marital status:      Spouse name: Mihai     Number of children: 1     Years of education: 14     Highest education level: Not on file   Occupational History     Occupation: quality tech     Employer: [employer]     Comment: [email or web address]   Social Needs     Financial resource strain: Not on file     Food insecurity     Worry: Not on file     Inability: Not on file     Transportation needs     Medical: Not on file     Non-medical: Not on file   Tobacco Use     Smoking status: Current Some Day Smoker     Packs/day: 0.50     Years: 15.00     Pack  years: 7.50     Types: Cigarettes     Last attempt to quit: 2013     Years since quittin.0     Smokeless tobacco: Never Used   Substance and Sexual Activity     Alcohol use: Yes     Comment: 2-4 beers per week     Drug use: No     Sexual activity: Yes     Partners: Male     Comment: same relationship since    Lifestyle     Physical activity     Days per week: Not on file     Minutes per session: Not on file     Stress: Not on file   Relationships     Social connections     Talks on phone: Not on file     Gets together: Not on file     Attends Religion service: Not on file     Active member of club or organization: Not on file     Attends meetings of clubs or organizations: Not on file     Relationship status: Not on file     Intimate partner violence     Fear of current or ex partner: Not on file     Emotionally abused: Not on file     Physically abused: Not on file     Forced sexual activity: Not on file   Other Topics Concern      Service No     Blood Transfusions No     Caffeine Concern Yes     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 Not Asked   Social History Narrative    Lives with  and 6 year daughter.  Has two dogs.         FAMILY HISTORY:  Family History   Problem Relation Age of Onset     Kidney Cancer Father 59        renal cell carcinoma     Osteoporosis Father         related to cancer     Connective Tissue Disorder Mother         rheumatoid arthritis     Chronic Obstructive Pulmonary Disease Mother         COPD, smoker     Cardiovascular Mother         carotid endarterectomy,peripheral vascular disease, [place]  "amputation, smoker     Hypertension Mother      Osteoporosis Mother         prednisone, no fractures     Hypertension Brother      Hypertension Maternal Grandfather      Cervical Cancer Maternal Grandmother 30         at 56     Cerebrovascular Disease Paternal Grandfather      Neurologic Disorder Sister         headaches     Breast Cancer Cousin 51        two maternal cousins, diagnosed at 51 and 52         PHYSICAL EXAM:  Vital signs:  /58 (BP Location: Left arm, Patient Position: Sitting, Cuff Size: Adult Regular)   Pulse 67   Temp 98.3  F (36.8  C) (Oral)   Resp 16   Ht 1.6 m (5' 3\")   Wt 71.7 kg (158 lb)   SpO2 99%   BMI 27.99 kg/m     ECO  GENERAL/CONSTITUTIONAL: No acute distress.  EYES: No scleral icterus.  LYMPH: No anterior cervical, posterior cervical, supraclavicular, or axillary adenopathy.   RESPIRATORY: Clear to auscultation bilaterally. No crackles or wheezing.   CARDIOVASCULAR: Regular rate and rhythm without murmurs, gallops, or rubs.  GASTROINTESTINAL: No tenderness. The patient has normal bowel sounds. No guarding.  No distention.  BREAST: Right-s/p right lumpectomy, scarring near the lumpectomy site at the 11 o'clock position; Left-no palpable mass, discharge, rash, or axillary lymphadenopathy.   MUSCULOSKELETAL: Warm and well-perfused, no cyanosis, clubbing, or edema.  NEUROLOGIC: Alert, oriented, answers questions appropriately.  INTEGUMENTARY: No rashes or jaundice.  Freckles and moles.  GAIT: Steady, does not use assistive device      LABS:  None.      IMAGING:  MRI breast 20:  BI-RADS 2 - benign    Bilateral mammogram 21:  BI-RADS 2 - benign      ASSESSMENT/PLAN:  Malu Benavides is a 50 year old female with:    1) Right breast [id number] ER/OR-, Her 2 + IDC, s/p lumpectomy, adjuvant TCHP x 5. C6 Taxotere is not delivered due to neuropathy. She has completed 1 year of Herceptin.  She had ER/OR - negative disease, so she would not benefit from oral antihormone " therapy.     She did see genetic counseling due to young age at diagnosis - showed a variant of uncertain significance in the BRCA2 gene.  The significance of this is unknown with regards to her breast cancer recurrence risk and ovarian cancer risk.  She did have a baseline transvaginal ultrasound and CA-125 test that were negative.  She has no family history of ovarian cancer, but does have 2 maternal cousins with breast cancer.  She is concerned about ovarian cancer, and has undergone MISSY/BSO.  I discussed the case at our breast cancer tumor conference, and consensus was to undergo surveillance like high-risk breast cancer patient, with MRI breasts and mammograms alternating every 6 months.  She strongly desired to have her ovaries removed, in light of her indeterminate BRCA and history of breast cancer.    Ultimately, she underwent MISSY/BSO on 4/11/16.    Mammogram in January 2021 was benign.    -MRI breast in 6 months  -RTC in 6 months for follow-up    2) Neuropathy: resolved. She has tapered off the gabapentin.     3) Pulmonary nodule on CT 12/2014. She is a former smoker, quit 2/2015. Repeat CT chest in July 2015 shows no evidence for metastatic disease.  The tiny nodule is thought most likely secondary to prominent vascular structure.    4) She had colonoscopy on 12/17/18, and 2 polyps were removed (sessile serrated adenoma and hyperplastic polyp).    -next colonoscopy was recommended for 5 years from then.    5) Migraines:  -she follows with her neurologist    6) Moles:   -advised diligent sunscreen use; regular skin checks with her PCP or dermatologist      Brigitte Aguilar MD  Hematology/Oncology  [facility]      Total time spent on day of visit, including review of tests, obtaining/reviewing separately obtained history, ordering medications/tests/procedures, communicating with PCP/consultants, and documenting in electronic medical record: 25 minutes

## 2021-01-15 NOTE — Clinical Note
1/15/2021         RE: Malu Benavides  [address] [place] [address]        Dear Colleague,    Thank you for referring your patient, Malu Benavides, to the [facility]. Please see a copy of my visit note below.    [facility]    Hematology/Oncology Established Patient Follow-up Note      Today's Date: 1/15/2021    Reason for Follow-up:  right breast [id number] ER-/MT-, Her 2 + IDC, s/p lumpectomy s/p adjuvant TCHP x 5, completed radiation on 7/28/15, completed 1 year of Herceptin.    HISTORY OF PRESENT ILLNESS: Malu Benavides is a 50 year old female who presents for follow-up for her breast cancer.  She was previously a patient of Dr. Long, then Dr. Sterling.  She presented at age 44, her routine mammogram demonstrated an abnormality in the right breast. Biopsy done 12/17/2014 showed grade 3 infiltrating ductal adenocarcinoma. Estrogen and progesterone receptors were negative. HER-2 was positive by FISH with a ratio of 8.5 and staging evaluation for metastatic disease was negative. She experienced a right lumpectomy and sentinel node biopsy on 01/15/2015 demonstrating that the tumor measured 1.2 x 0.8 cm. It was ER/MT negative and HER-2 positive. The solitary sentinel node was negative for metastatic disease. The tumor was therefore staged as pT1c N0 M0, HER-2 positive breast cancer.   She did not have breast complaints on presentation.     Dr. Long recommend TCHP in adjuvant setting with Taxotere, carboplatin, Herceptin and pertuzumab. She had C1-C5 from 01/29/2015 to 4/24/2015. C6 chemo is d/c by Dr. Long due to severe neuropathy. She is advised on continue Herceptin to finish total 1 yr duration of Tx.     She completed radiation 6/5/15-7/28/15.    She completed 1 year of Herceptin in January 2016.    She underwent MISSY/BSO on 4/11/16.      INTERIM HISTORY:  Idania is feeling well.  She notes back pain, but she saw her PCP and had x-rays done,  which showed degenerative disease.        REVIEW OF SYSTEMS:   14 point ROS was reviewed and is negative other than as noted above in HPI.     HOME MEDICATIONS:  Current Outpatient Medications   Medication Sig Dispense Refill     Acetaminophen (TYLENOL PO) Take 1,000 mg by mouth 2 times daily as needed for mild pain or fever       ACYCLOVIR PO Take 400 mg by mouth 5 times daily As needed for cold sores       FLUOXETINE HCL PO Take by mouth daily       ibuprofen (ADVIL,MOTRIN) 600 MG tablet Take 1 tablet (600 mg) by mouth every 6 hours as needed for moderate pain 120 tablet      nortriptyline (PAMELOR) 10 MG capsule        propranolol (INDERAL) 40 MG tablet Take 40 mg by mouth 2 times daily        SUMAtriptan Succinate (IMITREX PO) Take 100 mg by mouth daily as needed for migraine (100 mg at onset of migraine and MRx1 prn)       Topiramate (TOPAMAX PO) Take 100 mg by mouth At Bedtime           ALLERGIES:  Allergies   Allergen Reactions     No Known Allergies          PAST MEDICAL HISTORY:  Past Medical History:   Diagnosis Date     Adjustment disorder with mixed anxiety and depressed mood     anxiety initially with secondary depressive sx 2013     Breast cancer (H) 2015     Cervical cancer (H)      Chronic mixed headache syndrome     chronic daily headache and migraine     Dysthymic disorder 2013    Persistent anhedonia and fatigue     Former smoker     quit 2005, 8 pack year history     IBS (irritable bowel syndrome)     diarrhea predominate     Noninfectious ileitis      Papanicolaou smear of cervix with high grade squamous intraepithelial lesion (HGSIL) 2005    LEEP     Pregnancy induced hypertension     pregnancy induced hypertension     Recurrent herpes labialis     labialis     S/p small bowel obstruction     small bowel obstruction following appy, treated nonsurgically     Supervision of other normal pregnancy      - incomplete AB with suction curretage, C section for failure to  progress     Tubulovillous adenoma of rectum 8/2013    2 cm tubullvillous adenoma with no dysplasia         PAST SURGICAL HISTORY:  Past Surgical History:   Procedure Laterality Date     BIOPSY BREAST SEED LOCALIZATION Right 1/14/2015    Procedure: BIOPSY BREAST SEED LOCALIZATION;  Surgeon: Brant Townsend MD;  Location: [facility]     BREAST SURGERY       C APPENDECTOMY  1993     C EXPLORATORY OF ABDOMEN  1/2006    laparoscopy, mini laparotomy for drainage ovarian cyst, colonic adhesions     C/SECTION, LOW TRANSVERSE  1/2007    low segment transverse C section, extensive lysis of adhesions and repair of serosal bowel injuries     COLONOSCOPY  8/26/2013    2 mm polyp distal transverse colon(benign mucosa), 20 mm polyp recto-sigmoid colon(tubulovillous adenoma), repeat 3-5 years     CT SCAN ABDOMEN/PELVIS  8/2010    5-6 mm cyst caudate lobe of the liver, anteroverted uterus, 2.5x2.2 cm left adexal cyst     HC COLP CERVIX/UPPER VAGINA W LOOP ELEC BX CERVIX  1/2005     HC MRI BRAIN W/O CONTRAST  11/2004    paranasal sinus mucosal thickening, normal MRI brain       HYSTERECTOMY TOTAL ABDOMINAL, BILATERAL SALPINGO-OOPHORECTOMY, COMBINED N/A 4/11/2016    Procedure: COMBINED HYSTERECTOMY TOTAL ABDOMINAL, SALPINGO-OOPHORECTOMY;  Surgeon: Isamar Garza MD;  Location:  OR     OVERNIGHT OXIMETRY STUDY  2/2013    event index 1.9/hr, average O2 sat 96%, 16 sec with O2 sat < 88%, stable heart rate     SURGICAL HISTORY OF -   2004    suction curretage     SURGICAL HISTORY OF -   1/2007    Primary repair of multiple small bowel/sigmord colon serosal tears.         SOCIAL HISTORY:  Social History     Socioeconomic History     Marital status:      Spouse name: Mihai     Number of children: 1     Years of education: 14     Highest education level: Not on file   Occupational History     Occupation: quality tech     Employer: [employer]     Comment: [employer]   Social Needs     Financial resource strain:  Not on file     Food insecurity     Worry: Not on file     Inability: Not on file     Transportation needs     Medical: Not on file     Non-medical: Not on file   Tobacco Use     Smoking status: Current Some Day Smoker     Packs/day: 0.50     Years: 15.00     Pack years: 7.50     Types: Cigarettes     Last attempt to quit: 2013     Years since quittin.0     Smokeless tobacco: Never Used   Substance and Sexual Activity     Alcohol use: Yes     Comment: 2-4 beers per week     Drug use: No     Sexual activity: Yes     Partners: Male     Comment: same relationship since    Lifestyle     Physical activity     Days per week: Not on file     Minutes per session: Not on file     Stress: Not on file   Relationships     Social connections     Talks on phone: Not on file     Gets together: Not on file     Attends [employer] service: Not on file     Active member of club or organization: Not on file     Attends meetings of clubs or organizations: Not on file     Relationship status: Not on file     Intimate partner violence     Fear of current or ex partner: Not on file     Emotionally abused: Not on file     Physically abused: Not on file     Forced sexual activity: Not on file   Other Topics Concern      Service No     Blood Transfusions No     Caffeine Concern Yes     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 Not Asked   Social History Narrative    Lives with  and 6 year daughter.  Has two dogs.         FAMILY HISTORY:  Family History   Problem Relation Age of Onset     Kidney Cancer Father 59        renal cell carcinoma     Osteoporosis  "Father         related to cancer     Connective Tissue Disorder Mother         rheumatoid arthritis     Chronic Obstructive Pulmonary Disease Mother         COPD, smoker     Cardiovascular Mother         carotid endarterectomy,peripheral vascular disease, [place] amputation, smoker     Hypertension Mother      Osteoporosis Mother         prednisone, no fractures     Hypertension Brother      Hypertension Maternal Grandfather      Cervical Cancer Maternal Grandmother 30         at 56     Cerebrovascular Disease Paternal Grandfather      Neurologic Disorder Sister         headaches     Breast Cancer Cousin 51        two maternal cousins, diagnosed at 51 and 52         PHYSICAL EXAM:  Vital signs:  /58 (BP Location: Left arm, Patient Position: Sitting, Cuff Size: Adult Regular)   Pulse 67   Temp 98.3  F (36.8  C) (Oral)   Resp 16   Ht 1.6 m (5' 3\")   Wt 71.7 kg (158 lb)   SpO2 99%   BMI 27.99 kg/m     ECO  GENERAL/CONSTITUTIONAL: No acute distress.  EYES: No scleral icterus.  LYMPH: No anterior cervical, posterior cervical, supraclavicular, or axillary adenopathy.   RESPIRATORY: Clear to auscultation bilaterally. No crackles or wheezing.   CARDIOVASCULAR: Regular rate and rhythm without murmurs, gallops, or rubs.  GASTROINTESTINAL: No tenderness. The patient has normal bowel sounds. No guarding.  No distention.  BREAST: Right-s/p right lumpectomy, scarring near the lumpectomy site at the 11 o'clock position; Left-no palpable mass, discharge, rash, or axillary lymphadenopathy.   MUSCULOSKELETAL: Warm and well-perfused, no cyanosis, clubbing, or edema.  NEUROLOGIC: Alert, oriented, answers questions appropriately.  INTEGUMENTARY: No rashes or jaundice.  Freckles and moles.  GAIT: Steady, does not use assistive device      LABS:  None.      IMAGING:  MRI breast 20:  BI-RADS 2 - benign    Bilateral mammogram 21:  BI-RADS 2 - benign      ASSESSMENT/PLAN:  Malu Benavides is a 50 year old female " with:    1) Right breast [id number] ER/IA-, Her 2 + IDC, s/p lumpectomy, adjuvant TCHP x 5. C6 Taxotere is not delivered due to neuropathy. She has completed 1 year of Herceptin.  She had ER/IA - negative disease, so she would not benefit from oral antihormone therapy.     She did see genetic counseling due to young age at diagnosis - showed a variant of uncertain significance in the BRCA2 gene.  The significance of this is unknown with regards to her breast cancer recurrence risk and ovarian cancer risk.  She did have a baseline transvaginal ultrasound and CA-125 test that were negative.  She has no family history of ovarian cancer, but does have 2 maternal cousins with breast cancer.  She is concerned about ovarian cancer, and has undergone MISSY/BSO.  I discussed the case at our breast cancer tumor conference, and consensus was to undergo surveillance like high-risk breast cancer patient, with MRI breasts and mammograms alternating every 6 months.  She strongly desired to have her ovaries removed, in light of her indeterminate BRCA and history of breast cancer.    Ultimately, she underwent MISSY/BSO on 4/11/16.    Mammogram in January 2021 was benign.    -MRI breast in 6 months  -RTC in 6 months for follow-up    2) Neuropathy: resolved. She has tapered off the gabapentin.     3) Pulmonary nodule on CT 12/2014. She is a former smoker, quit 2/2015. Repeat CT chest in July 2015 shows no evidence for metastatic disease.  The tiny nodule is thought most likely secondary to prominent vascular structure.    4) She had colonoscopy on 12/17/18, and 2 polyps were removed (sessile serrated adenoma and hyperplastic polyp).    -next colonoscopy was recommended for 5 years from then.    5) Migraines:  -she follows with her neurologist    6) Moles:   -advised diligent sunscreen use; regular skin checks with her PCP or dermatologist      Brigitte Aguilar MD  Hematology/Oncology  [facility]      Total time spent  "on day of visit, including review of tests, obtaining/reviewing separately obtained history, ordering medications/tests/procedures, communicating with PCP/consultants, and documenting in electronic medical record: 25 minutes      Oncology Rooming Note    January 15, 2021 10:08 AM   Malu Benavides is a 50 year old female who presents for:    Chief Complaint   Patient presents with     Oncology Clinic Visit     Malignant neoplasm of right breast (H)     Initial Vitals: /58 (BP Location: Left arm, Patient Position: Sitting, Cuff Size: Adult Regular)   Pulse 67   Temp 98.3  F (36.8  C) (Oral)   Resp 16   Ht 1.6 m (5' 3\")   Wt 71.7 kg (158 lb)   SpO2 99%   BMI 27.99 kg/m   Estimated body mass index is 27.99 kg/m  as calculated from the following:    Height as of this encounter: 1.6 m (5' 3\").    Weight as of this encounter: 71.7 kg (158 lb). Body surface area is 1.79 meters squared.  No Pain (0) Comment: Data Unavailable   No LMP recorded. (Menstrual status: UNKNOWN).  Allergies reviewed: Yes  Medications reviewed: Yes    Medications: Medication refills not needed today.  Pharmacy name entered into [employer]:    [facility] [id number] [place], [place] - [address] [facility] - [place] - [address] AT PORTAL TO REGISTERED [facility] SITES  [facility] [address] [place], [place] - [address] [facility] [place], [place] - [address] [address]    Clinical concerns: no       Rupa Julien CMA                  Again, thank you for allowing me to participate in the care of your patient.        Sincerely,        Brigitte Aguilar MD  "

## 2021-01-15 NOTE — PROGRESS NOTES
"Oncology Rooming Note    January 15, 2021 10:08 AM   Malu Benavides is a 50 year old female who presents for:    Chief Complaint   Patient presents with     Oncology Clinic Visit     Malignant neoplasm of right breast (H)     Initial Vitals: /58 (BP Location: Left arm, Patient Position: Sitting, Cuff Size: Adult Regular)   Pulse 67   Temp 98.3  F (36.8  C) (Oral)   Resp 16   Ht 1.6 m (5' 3\")   Wt 71.7 kg (158 lb)   SpO2 99%   BMI 27.99 kg/m   Estimated body mass index is 27.99 kg/m  as calculated from the following:    Height as of this encounter: 1.6 m (5' 3\").    Weight as of this encounter: 71.7 kg (158 lb). Body surface area is 1.79 meters squared.  No Pain (0) Comment: Data Unavailable   No LMP recorded. (Menstrual status: UNKNOWN).  Allergies reviewed: Yes  Medications reviewed: Yes    Medications: Medication refills not needed today.  Pharmacy name entered into [facility]:    [facility] [address] [place] [facility] [place] - [address] [facility] [place] - [address] AT PORTAL TO REGISTERED [facility] SITES  [facility] [address] [place] [facility] [place] - [address] [facility]ACY [facility] [place] - [address] [address]    Clinical concerns: no       Rupa Julien CMA              "

## 2021-03-22 ENCOUNTER — IMMUNIZATION (OUTPATIENT)
Dept: NURSING | Facility: CLINIC | Age: 51
End: 2021-03-22
Payer: COMMERCIAL

## 2021-03-22 PROCEDURE — 91300 PR COVID VAC PFIZER DIL RECON 30 MCG/0.3 ML IM: CPT

## 2021-03-22 PROCEDURE — 0001A PR COVID VAC PFIZER DIL RECON 30 MCG/0.3 ML IM: CPT

## 2021-04-12 ENCOUNTER — IMMUNIZATION (OUTPATIENT)
Dept: NURSING | Facility: CLINIC | Age: 51
End: 2021-04-12
Attending: INTERNAL MEDICINE
Payer: COMMERCIAL

## 2021-04-12 PROCEDURE — 0002A PR COVID VAC PFIZER DIL RECON 30 MCG/0.3 ML IM: CPT

## 2021-04-12 PROCEDURE — 91300 PR COVID VAC PFIZER DIL RECON 30 MCG/0.3 ML IM: CPT

## 2021-08-20 ENCOUNTER — HOSPITAL ENCOUNTER (OUTPATIENT)
Dept: MRI IMAGING | Facility: CLINIC | Age: 51
Discharge: HOME | End: 2021-08-20
Attending: INTERNAL MEDICINE | Admitting: INTERNAL MEDICINE
Payer: COMMERCIAL

## 2021-08-20 DIAGNOSIS — C50.911 MALIGNANT NEOPLASM OF RIGHT BREAST IN FEMALE, ESTROGEN RECEPTOR NEGATIVE, UNSPECIFIED SITE OF BREAST (H): ICD-10-CM

## 2021-08-20 DIAGNOSIS — Z17.1 MALIGNANT NEOPLASM OF RIGHT BREAST IN FEMALE, ESTROGEN RECEPTOR NEGATIVE, UNSPECIFIED SITE OF BREAST (H): ICD-10-CM

## 2021-08-20 PROCEDURE — 77049 MRI BREAST C-+ W/CAD BI: CPT

## 2021-08-20 PROCEDURE — 255N000002 HC RX 255 OP 636: Performed by: INTERNAL MEDICINE

## 2021-08-20 PROCEDURE — A9585 GADOBUTROL INJECTION: HCPCS | Performed by: INTERNAL MEDICINE

## 2021-08-20 RX ORDER — GADOBUTROL 604.72 MG/ML
7 INJECTION INTRAVENOUS ONCE
Status: COMPLETED | OUTPATIENT
Start: 2021-08-20 | End: 2021-08-20

## 2021-08-20 RX ADMIN — GADOBUTROL 7 ML: 604.72 INJECTION INTRAVENOUS at 07:42

## 2021-09-24 ENCOUNTER — ONCOLOGY VISIT (OUTPATIENT)
Dept: ONCOLOGY | Facility: CLINIC | Age: 51
End: 2021-09-24
Attending: INTERNAL MEDICINE
Payer: COMMERCIAL

## 2021-09-24 VITALS
HEIGHT: 63 IN | HEART RATE: 64 BPM | RESPIRATION RATE: 16 BRPM | BODY MASS INDEX: 28 KG/M2 | TEMPERATURE: 97.4 F | OXYGEN SATURATION: 99 % | SYSTOLIC BLOOD PRESSURE: 115 MMHG | DIASTOLIC BLOOD PRESSURE: 75 MMHG | WEIGHT: 158 LBS

## 2021-09-24 DIAGNOSIS — C50.911 MALIGNANT NEOPLASM OF RIGHT BREAST IN FEMALE, ESTROGEN RECEPTOR NEGATIVE, UNSPECIFIED SITE OF BREAST (H): ICD-10-CM

## 2021-09-24 DIAGNOSIS — Z12.31 VISIT FOR SCREENING MAMMOGRAM: Primary | ICD-10-CM

## 2021-09-24 DIAGNOSIS — Z17.1 MALIGNANT NEOPLASM OF RIGHT BREAST IN FEMALE, ESTROGEN RECEPTOR NEGATIVE, UNSPECIFIED SITE OF BREAST (H): ICD-10-CM

## 2021-09-24 PROCEDURE — 99213 OFFICE O/P EST LOW 20 MIN: CPT | Performed by: INTERNAL MEDICINE

## 2021-09-24 ASSESSMENT — PAIN SCALES - GENERAL: PAINLEVEL: NO PAIN (0)

## 2021-09-24 ASSESSMENT — MIFFLIN-ST. JEOR: SCORE: 1290.81

## 2021-09-24 NOTE — LETTER
"    9/24/2021         RE: Malu Benavides  [address] [place] [address]        Dear Colleague,    Thank you for referring your patient, Malu Benavides, to the [facility]. Please see a copy of my visit note below.    Oncology Rooming Note    September 24, 2021 10:27 AM   Malu Benavides is a 51 year old female who presents for:    Chief Complaint   Patient presents with     Oncology Clinic Visit     Initial Vitals: There were no vitals taken for this visit. Estimated body mass index is 27.99 kg/m  as calculated from the following:    Height as of 1/15/21: 1.6 m (5' 3\").    Weight as of 1/15/21: 71.7 kg (158 lb). There is no height or weight on file to calculate BSA.  Data Unavailable Comment: Data Unavailable   No LMP recorded. (Menstrual status: UNKNOWN).  Allergies reviewed: Yes  Medications reviewed: Yes    Medications: Medication refills not needed today.  Pharmacy name entered into Tivra:    [facility] [id number] [place] [facility] [place] - [address] [place] [address] PHARMACY [place], [place] - [address] AT PORTAL TO REGISTERED [facility] SITES  [facility] [address] [place] [facility] [place] - [address] [facility] [place], [place] - [address] [address]    Clinical concerns:  doctor was notified.      Sally Day MA              [facility]    Hematology/Oncology Established Patient Follow-up Note      Today's Date: 9/24/2021    Reason for Follow-up:  right breast [id number] ER-/OR-, Her 2 + IDC, s/p lumpectomy s/p adjuvant TCHP x 5, completed radiation on 7/28/15, completed 1 year of Herceptin.    HISTORY OF PRESENT ILLNESS: Malu Benavides is a 51 year old female who presents for follow-up for her breast cancer.  She was previously a patient of Dr. Long, then Dr. Sterling.  She presented at age 44, her routine mammogram demonstrated an abnormality in the right breast. Biopsy done 12/17/2014 showed grade 3 infiltrating " ductal adenocarcinoma. Estrogen and progesterone receptors were negative. HER-2 was positive by FISH with a ratio of 8.5 and staging evaluation for metastatic disease was negative. She experienced a right lumpectomy and sentinel node biopsy on 01/15/2015 demonstrating that the tumor measured 1.2 x 0.8 cm. It was ER/FL negative and HER-2 positive. The solitary sentinel node was negative for metastatic disease. The tumor was therefore staged as pT1c N0 M0, HER-2 positive breast cancer.   She did not have breast complaints on presentation.     Dr. Long recommend TCHP in adjuvant setting with Taxotere, carboplatin, Herceptin and pertuzumab. She had C1-C5 from 01/29/2015 to 4/24/2015. C6 chemo is d/c by Dr. Long due to severe neuropathy. She is advised on continue Herceptin to finish total 1 yr duration of Tx.     She completed radiation 6/5/15-7/28/15.    She completed 1 year of Herceptin in January 2016.    She underwent MISSY/BSO on 4/11/16.      INTERIM HISTORY:  Idania is doing well.  She has no new complaints today.        REVIEW OF SYSTEMS:   14 point ROS was reviewed and is negative other than as noted above in HPI.     HOME MEDICATIONS:  Current Outpatient Medications   Medication Sig Dispense Refill     Acetaminophen (TYLENOL PO) Take 1,000 mg by mouth 2 times daily as needed for mild pain or fever       ACYCLOVIR PO Take 400 mg by mouth 5 times daily As needed for cold sores       FLUOXETINE HCL PO Take by mouth daily       ibuprofen (ADVIL,MOTRIN) 600 MG tablet Take 1 tablet (600 mg) by mouth every 6 hours as needed for moderate pain 120 tablet      nortriptyline (PAMELOR) 10 MG capsule        propranolol (INDERAL) 40 MG tablet Take 40 mg by mouth 2 times daily        SUMAtriptan Succinate (IMITREX PO) Take 100 mg by mouth daily as needed for migraine (100 mg at onset of migraine and MRx1 prn)       Topiramate (TOPAMAX PO) Take 100 mg by mouth At Bedtime           ALLERGIES:  Allergies   Allergen Reactions      No Known Allergies          PAST MEDICAL HISTORY:  Past Medical History:   Diagnosis Date     Adjustment disorder with mixed anxiety and depressed mood 2009    anxiety initially with secondary depressive sx 2013     Breast cancer (H) 2015     Cervical cancer (H)      Chronic mixed headache syndrome     chronic daily headache and migraine     Dysthymic disorder 2013    Persistent anhedonia and fatigue     Former smoker     quit , 8 pack year history     IBS (irritable bowel syndrome)     diarrhea predominate     Noninfectious ileitis      Papanicolaou smear of cervix with high grade squamous intraepithelial lesion (HGSIL) 2005    LEEP     Pregnancy induced hypertension     pregnancy induced hypertension     Recurrent herpes labialis     labialis     S/p small bowel obstruction     small bowel obstruction following appy, treated nonsurgically     Supervision of other normal pregnancy      - incomplete AB with suction curretage, C section for failure to progress     Tubulovillous adenoma of rectum 2013    2 cm tubullvillous adenoma with no dysplasia         PAST SURGICAL HISTORY:  Past Surgical History:   Procedure Laterality Date     BIOPSY BREAST SEED LOCALIZATION Right 2015    Procedure: BIOPSY BREAST SEED LOCALIZATION;  Surgeon: Brant Townsend MD;  Location: [facility]     BREAST SURGERY       C APPENDECTOMY       C EXPLORATORY OF ABDOMEN  2006    laparoscopy, mini laparotomy for drainage ovarian cyst, colonic adhesions     C/SECTION, LOW TRANSVERSE  2007    low segment transverse C section, extensive lysis of adhesions and repair of serosal bowel injuries     COLONOSCOPY  2013    2 mm polyp distal transverse colon(benign mucosa), 20 mm polyp recto-sigmoid colon(tubulovillous adenoma), repeat 3-5 years     CT SCAN ABDOMEN/PELVIS  2010    5-6 mm cyst caudate lobe of the liver, anteroverted uterus, 2.5x2.2 cm left adexal cyst     HC COLP CERVIX/UPPER VAGINA W  LOOP ELEC BX CERVIX  2005     HC MRI BRAIN W/O CONTRAST  2004    paranasal sinus mucosal thickening, normal MRI brain       HYSTERECTOMY TOTAL ABDOMINAL, BILATERAL SALPINGO-OOPHORECTOMY, COMBINED N/A 2016    Procedure: COMBINED HYSTERECTOMY TOTAL ABDOMINAL, SALPINGO-OOPHORECTOMY;  Surgeon: Isamar Garza MD;  Location:  OR     OVERNIGHT OXIMETRY STUDY  2013    event index 1.9/hr, average O2 sat 96%, 16 sec with O2 sat < 88%, stable heart rate     SURGICAL HISTORY OF -       suction curretage     SURGICAL HISTORY OF -   2007    Primary repair of multiple small bowel/sigmord colon serosal tears.         SOCIAL HISTORY:  Social History     Socioeconomic History     Marital status:      Spouse name: Mhiai     Number of children: 1     Years of education: 14     Highest education level: Not on file   Occupational History     Occupation: quality tech     Employer: [employer]     Comment: [employer]   Tobacco Use     Smoking status: Current Some Day Smoker     Packs/day: 0.50     Years: 15.00     Pack years: 7.50     Types: Cigarettes     Last attempt to quit: 2013     Years since quittin.7     Smokeless tobacco: Never Used   Substance and Sexual Activity     Alcohol use: Yes     Comment: 2-4 beers per week     Drug use: No     Sexual activity: Yes     Partners: Male     Comment: same relationship since    Other Topics Concern      Service No     Blood Transfusions No     Caffeine Concern Yes     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 Not Asked   Social History Narrative    Lives with  and 6  "year daughter.  Has two dogs.     Social Determinants of Health     Financial Resource Strain:      Difficulty of Paying Living Expenses:    Food Insecurity:      Worried About Running Out of Food in the Last Year:      Ran Out of Food in the Last Year:    Transportation Needs:      Lack of Transportation (Medical):      Lack of Transportation (Non-Medical):    Physical Activity:      Days of Exercise per Week:      Minutes of Exercise per Session:    Stress:      Feeling of Stress :    Social Connections:      Frequency of Communication with Friends and Family:      Frequency of Social Gatherings with Friends and Family:      Attends Scientologist Services:      Active Member of Clubs or Organizations:      Attends Club or Organization Meetings:      Marital Status:    Intimate Partner Violence:      Fear of Current or Ex-Partner:      Emotionally Abused:      Physically Abused:      Sexually Abused:          FAMILY HISTORY:  Family History   Problem Relation Age of Onset     Kidney Cancer Father 59        renal cell carcinoma     Osteoporosis Father         related to cancer     Connective Tissue Disorder Mother         rheumatoid arthritis     Chronic Obstructive Pulmonary Disease Mother         COPD, smoker     Cardiovascular Mother         carotid endarterectomy,peripheral vascular disease, [place] amputation, smoker     Hypertension Mother      Osteoporosis Mother         prednisone, no fractures     Hypertension Brother      Hypertension Maternal Grandfather      Cervical Cancer Maternal Grandmother 30         at 56     Cerebrovascular Disease Paternal Grandfather      Neurologic Disorder Sister         headaches     Breast Cancer Cousin 51        two maternal cousins, diagnosed at 51 and 52         PHYSICAL EXAM:  Vital signs:  /75   Pulse 64   Temp 97.4  F (36.3  C)   Resp 16   Ht 1.6 m (5' 3\")   Wt 71.7 kg (158 lb)   SpO2 99%   BMI 27.99 kg/m     ECO  GENERAL/CONSTITUTIONAL: No acute " distress.  EYES: No scleral icterus.  LYMPH: No anterior cervical, posterior cervical, supraclavicular, or axillary adenopathy.   RESPIRATORY: Clear to auscultation bilaterally. No crackles or wheezing.   CARDIOVASCULAR: Regular rate and rhythm without murmurs, gallops, or rubs.  GASTROINTESTINAL: No tenderness. No guarding.  No distention.  BREAST: Right-s/p right lumpectomy, scarring near the lumpectomy site at the 11 o'clock position; Left-no palpable mass, discharge, rash, or axillary lymphadenopathy.   MUSCULOSKELETAL: Warm and well-perfused, no cyanosis, clubbing, or edema.  NEUROLOGIC: Alert, oriented, answers questions appropriately.  INTEGUMENTARY: No rashes or jaundice.  Freckles and moles.  GAIT: Steady, does not use assistive device        IMAGING:  Bilateral mammogram 1/08/21:  BI-RADS 2 - benign    MRI breast 8/20/21:  BI-RADS 2 - benign      ASSESSMENT/PLAN:  Malu Benavides is a 51 year old female with:    1) Right breast [id number] ER/SC-, Her 2 + IDC, s/p lumpectomy, adjuvant TCHP x 5. C6 Taxotere is not delivered due to neuropathy. She has completed 1 year of Herceptin.  She had ER/SC - negative disease, so she would not benefit from oral antihormone therapy.     She did see genetic counseling due to young age at diagnosis - showed a variant of uncertain significance in the BRCA2 gene.  The significance of this is unknown with regards to her breast cancer recurrence risk and ovarian cancer risk.  She did have a baseline transvaginal ultrasound and CA-125 test that were negative.  She has no family history of ovarian cancer, but does have 2 maternal cousins with breast cancer.  She is concerned about ovarian cancer, and has undergone MISSY/BSO.  I discussed the case at our breast cancer tumor conference, and consensus was to undergo surveillance like high-risk breast cancer patient, with MRI breasts and mammograms alternating every 6 months.  She strongly desired to have her ovaries removed, in light  of her indeterminate BRCA and history of breast cancer.    Ultimately, she underwent MISSY/BSO on 4/11/16.    Mammogram in January 2021 was benign.   MRI breast on 8/20/21 was benign.    -next mammogram in January 2022 - ordered  -MRI breast in in July 2022 - ordered  -RTC in 1 year for follow-up    2) Neuropathy: resolved. She has tapered off the gabapentin.     3) Pulmonary nodule on CT 12/2014. She is a former smoker, quit 2/2015. Repeat CT chest in July 2015 shows no evidence for metastatic disease.  The tiny nodule is thought most likely secondary to prominent vascular structure.    4) She had colonoscopy on 12/17/18, and 2 polyps were removed (sessile serrated adenoma and hyperplastic polyp).    -next colonoscopy was recommended for 5 years from then.    5) Migraines:  -she follows with her neurologist    6) Moles:   -advised diligent sunscreen use; regular skin checks with her PCP or dermatologist      Brigitte Aguilar MD  Hematology/Oncology  [facility]      Total time spent on day of visit, including review of tests, obtaining/reviewing separately obtained history, ordering medications/tests/procedures, communicating with PCP/consultants, and documenting in electronic medical record: 20 minutes        Again, thank you for allowing me to participate in the care of your patient.        Sincerely,        Brigitte Aguilar MD

## 2021-09-24 NOTE — PROGRESS NOTES
[facility]    Hematology/Oncology Established Patient Follow-up Note      Today's Date: 9/24/2021    Reason for Follow-up:  right breast [id number] ER-/OK-, Her 2 + IDC, s/p lumpectomy s/p adjuvant TCHP x 5, completed radiation on 7/28/15, completed 1 year of Herceptin.    HISTORY OF PRESENT ILLNESS: Malu Benavides is a 51 year old female who presents for follow-up for her breast cancer.  She was previously a patient of Dr. Long, then Dr. Sterling.  She presented at age 44, her routine mammogram demonstrated an abnormality in the right breast. Biopsy done 12/17/2014 showed grade 3 infiltrating ductal adenocarcinoma. Estrogen and progesterone receptors were negative. HER-2 was positive by FISH with a ratio of 8.5 and staging evaluation for metastatic disease was negative. She experienced a right lumpectomy and sentinel node biopsy on 01/15/2015 demonstrating that the tumor measured 1.2 x 0.8 cm. It was ER/OK negative and HER-2 positive. The solitary sentinel node was negative for metastatic disease. The tumor was therefore staged as pT1c N0 M0, HER-2 positive breast cancer.   She did not have breast complaints on presentation.     Dr. Long recommend TCHP in adjuvant setting with Taxotere, carboplatin, Herceptin and pertuzumab. She had C1-C5 from 01/29/2015 to 4/24/2015. C6 chemo is d/c by Dr. Long due to severe neuropathy. She is advised on continue Herceptin to finish total 1 yr duration of Tx.     She completed radiation 6/5/15-7/28/15.    She completed 1 year of Herceptin in January 2016.    She underwent MISSY/BSO on 4/11/16.      INTERIM HISTORY:  Idania is doing well.  She has no new complaints today.        REVIEW OF SYSTEMS:   14 point ROS was reviewed and is negative other than as noted above in HPI.     HOME MEDICATIONS:  Current Outpatient Medications   Medication Sig Dispense Refill     Acetaminophen (TYLENOL PO) Take 1,000 mg by mouth 2 times daily as needed for mild pain or fever        ACYCLOVIR PO Take 400 mg by mouth 5 times daily As needed for cold sores       FLUOXETINE HCL PO Take by mouth daily       ibuprofen (ADVIL,MOTRIN) 600 MG tablet Take 1 tablet (600 mg) by mouth every 6 hours as needed for moderate pain 120 tablet      nortriptyline (PAMELOR) 10 MG capsule        propranolol (INDERAL) 40 MG tablet Take 40 mg by mouth 2 times daily        SUMAtriptan Succinate (IMITREX PO) Take 100 mg by mouth daily as needed for migraine (100 mg at onset of migraine and MRx1 prn)       Topiramate (TOPAMAX PO) Take 100 mg by mouth At Bedtime           ALLERGIES:  Allergies   Allergen Reactions     No Known Allergies          PAST MEDICAL HISTORY:  Past Medical History:   Diagnosis Date     Adjustment disorder with mixed anxiety and depressed mood     anxiety initially with secondary depressive sx      Breast cancer (H) 2015     Cervical cancer (H)      Chronic mixed headache syndrome     chronic daily headache and migraine     Dysthymic disorder 2013    Persistent anhedonia and fatigue     Former smoker     quit , 8 pack year history     IBS (irritable bowel syndrome)     diarrhea predominate     Noninfectious ileitis      Papanicolaou smear of cervix with high grade squamous intraepithelial lesion (HGSIL) 2005    LEEP     Pregnancy induced hypertension     pregnancy induced hypertension     Recurrent herpes labialis     labialis     S/p small bowel obstruction     small bowel obstruction following appy, treated nonsurgically     Supervision of other normal pregnancy      - incomplete AB with suction curretage, C section for failure to progress     Tubulovillous adenoma of rectum 2013    2 cm tubullvillous adenoma with no dysplasia         PAST SURGICAL HISTORY:  Past Surgical History:   Procedure Laterality Date     BIOPSY BREAST SEED LOCALIZATION Right 2015    Procedure: BIOPSY BREAST SEED LOCALIZATION;  Surgeon: Brant Townsend MD;  Location:   [facility]     BREAST SURGERY       C APPENDECTOMY       C EXPLORATORY OF ABDOMEN  2006    laparoscopy, mini laparotomy for drainage ovarian cyst, colonic adhesions     C/SECTION, LOW TRANSVERSE  2007    low segment transverse C section, extensive lysis of adhesions and repair of serosal bowel injuries     COLONOSCOPY  2013    2 mm polyp distal transverse colon(benign mucosa), 20 mm polyp recto-sigmoid colon(tubulovillous adenoma), repeat 3-5 years     CT SCAN ABDOMEN/PELVIS  2010    5-6 mm cyst caudate lobe of the liver, anteroverted uterus, 2.5x2.2 cm left adexal cyst     HC COLP CERVIX/UPPER VAGINA W LOOP ELEC BX CERVIX  2005     HC MRI BRAIN W/O CONTRAST  2004    paranasal sinus mucosal thickening, normal MRI brain       HYSTERECTOMY TOTAL ABDOMINAL, BILATERAL SALPINGO-OOPHORECTOMY, COMBINED N/A 2016    Procedure: COMBINED HYSTERECTOMY TOTAL ABDOMINAL, SALPINGO-OOPHORECTOMY;  Surgeon: Isamar Garza MD;  Location:  OR     OVERNIGHT OXIMETRY STUDY  2013    event index 1.9/hr, average O2 sat 96%, 16 sec with O2 sat < 88%, stable heart rate     SURGICAL HISTORY OF -       suction curretage     SURGICAL HISTORY OF -   2007    Primary repair of multiple small bowel/sigmord colon serosal tears.         SOCIAL HISTORY:  Social History     Socioeconomic History     Marital status:      Spouse name: Mihai     Number of children: Roger     Years of education: 14     Highest education level: Not on file   Occupational History     Occupation: quality tech     Employer: [employer]     Comment: [employer]   Tobacco Use     Smoking status: Current Some Day Smoker     Packs/day: 0.50     Years: 15.00     Pack years: 7.50     Types: Cigarettes     Last attempt to quit: 2013     Years since quittin.7     Smokeless tobacco: Never Used   Substance and Sexual Activity     Alcohol use: Yes     Comment: 2-4 beers per week     Drug use: No     Sexual activity: Yes     Partners:  Male     Comment: same relationship since 1988   Other Topics Concern      Service No     Blood Transfusions No     Caffeine Concern Yes     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 Not Asked   Social History Narrative    Lives with  and 6 year daughter.  Has two dogs.     Social Determinants of Health     Financial Resource Strain:      Difficulty of Paying Living Expenses:    Food Insecurity:      Worried About Running Out of Food in the Last Year:      Ran Out of Food in the Last Year:    Transportation Needs:      Lack of Transportation (Medical):      Lack of Transportation (Non-Medical):    Physical Activity:      Days of Exercise per Week:      Minutes of Exercise per Session:    Stress:      Feeling of Stress :    Social Connections:      Frequency of Communication with Friends and Family:      Frequency of Social Gatherings with Friends and Family:      Attends [employer] Services:      Active Member of Clubs or Organizations:      Attends Club or Organization Meetings:      Marital Status:    Intimate Partner Violence:      Fear of Current or Ex-Partner:      Emotionally Abused:      Physically Abused:      Sexually Abused:          FAMILY HISTORY:  Family History   Problem Relation Age of Onset     Kidney Cancer Father 59        renal cell carcinoma     Osteoporosis Father         related to cancer     Connective Tissue Disorder Mother         rheumatoid arthritis     Chronic Obstructive Pulmonary Disease Mother         COPD, smoker     Cardiovascular Mother         carotid endarterectomy,peripheral vascular disease, [place] amputation,  "smoker     Hypertension Mother      Osteoporosis Mother         prednisone, no fractures     Hypertension Brother      Hypertension Maternal Grandfather      Cervical Cancer Maternal Grandmother 30         at 56     Cerebrovascular Disease Paternal Grandfather      Neurologic Disorder Sister         headaches     Breast Cancer Cousin 51        two maternal cousins, diagnosed at 51 and 52         PHYSICAL EXAM:  Vital signs:  /75   Pulse 64   Temp 97.4  F (36.3  C)   Resp 16   Ht 1.6 m (5' 3\")   Wt 71.7 kg (158 lb)   SpO2 99%   BMI 27.99 kg/m     ECO  GENERAL/CONSTITUTIONAL: No acute distress.  EYES: No scleral icterus.  LYMPH: No anterior cervical, posterior cervical, supraclavicular, or axillary adenopathy.   RESPIRATORY: Clear to auscultation bilaterally. No crackles or wheezing.   CARDIOVASCULAR: Regular rate and rhythm without murmurs, gallops, or rubs.  GASTROINTESTINAL: No tenderness. No guarding.  No distention.  BREAST: Right-s/p right lumpectomy, scarring near the lumpectomy site at the 11 o'clock position; Left-no palpable mass, discharge, rash, or axillary lymphadenopathy.   MUSCULOSKELETAL: Warm and well-perfused, no cyanosis, clubbing, or edema.  NEUROLOGIC: Alert, oriented, answers questions appropriately.  INTEGUMENTARY: No rashes or jaundice.  Freckles and moles.  GAIT: Steady, does not use assistive device        IMAGING:  Bilateral mammogram 21:  BI-RADS 2 - benign    MRI breast 21:  BI-RADS 2 - benign      ASSESSMENT/PLAN:  Malu Benavides is a 51 year old female with:    1) Right breast [id number] ER/CT-, Her 2 + IDC, s/p lumpectomy, adjuvant TCHP x 5. C6 Taxotere is not delivered due to neuropathy. She has completed 1 year of Herceptin.  She had ER/CT - negative disease, so she would not benefit from oral antihormone therapy.     She did see genetic counseling due to young age at diagnosis - showed a variant of uncertain significance in the BRCA2 gene.  The " significance of this is unknown with regards to her breast cancer recurrence risk and ovarian cancer risk.  She did have a baseline transvaginal ultrasound and CA-125 test that were negative.  She has no family history of ovarian cancer, but does have 2 maternal cousins with breast cancer.  She is concerned about ovarian cancer, and has undergone MISSY/BSO.  I discussed the case at our breast cancer tumor conference, and consensus was to undergo surveillance like high-risk breast cancer patient, with MRI breasts and mammograms alternating every 6 months.  She strongly desired to have her ovaries removed, in light of her indeterminate BRCA and history of breast cancer.    Ultimately, she underwent MISSY/BSO on 4/11/16.    Mammogram in January 2021 was benign.   MRI breast on 8/20/21 was benign.    -next mammogram in January 2022 - ordered  -MRI breast in in July 2022 - ordered  -RTC in 1 year for follow-up    2) Neuropathy: resolved. She has tapered off the gabapentin.     3) Pulmonary nodule on CT 12/2014. She is a former smoker, quit 2/2015. Repeat CT chest in July 2015 shows no evidence for metastatic disease.  The tiny nodule is thought most likely secondary to prominent vascular structure.    4) She had colonoscopy on 12/17/18, and 2 polyps were removed (sessile serrated adenoma and hyperplastic polyp).    -next colonoscopy was recommended for 5 years from then.    5) Migraines:  -she follows with her neurologist    6) Moles:   -advised diligent sunscreen use; regular skin checks with her PCP or dermatologist      Brigitte Aguilar MD  Hematology/Oncology  [facility]      Total time spent on day of visit, including review of tests, obtaining/reviewing separately obtained history, ordering medications/tests/procedures, communicating with PCP/consultants, and documenting in electronic medical record: 20 minutes

## 2021-09-24 NOTE — PROGRESS NOTES
"Oncology Rooming Note    September 24, 2021 10:27 AM   Malu Benavides is a 51 year old female who presents for:    Chief Complaint   Patient presents with     Oncology Clinic Visit     Initial Vitals: There were no vitals taken for this visit. Estimated body mass index is 27.99 kg/m  as calculated from the following:    Height as of 1/15/21: 1.6 m (5' 3\").    Weight as of 1/15/21: 71.7 kg (158 lb). There is no height or weight on file to calculate BSA.  Data Unavailable Comment: Data Unavailable   No LMP recorded. (Menstrual status: UNKNOWN).  Allergies reviewed: Yes  Medications reviewed: Yes    Medications: Medication refills not needed today.  Pharmacy name entered into [facility] [facility]:    [facility] [id number] [place] [facility] [place] - [address] [place], [place] [address] [facility] [place] [address] AT PORTAL TO REGISTERED [facility] SITES  [facility] [id number] [place] [facility] [place] - [address] [facility] [place] - [address] [address]    Clinical concerns:  doctor was notified.      Sally Day MA            "

## 2022-01-28 ENCOUNTER — HOSPITAL ENCOUNTER (OUTPATIENT)
Dept: MAMMOGRAPHY | Facility: CLINIC | Age: 52
Discharge: HOME | End: 2022-01-28
Attending: INTERNAL MEDICINE | Admitting: INTERNAL MEDICINE
Payer: COMMERCIAL

## 2022-01-28 DIAGNOSIS — Z12.31 VISIT FOR SCREENING MAMMOGRAM: ICD-10-CM

## 2022-01-28 PROCEDURE — 77067 SCR MAMMO BI INCL CAD: CPT

## 2022-08-26 ENCOUNTER — ANCILLARY PROCEDURE (OUTPATIENT)
Dept: MRI IMAGING | Facility: CLINIC | Age: 52
End: 2022-08-26
Attending: INTERNAL MEDICINE
Payer: COMMERCIAL

## 2022-08-26 DIAGNOSIS — C50.911 MALIGNANT NEOPLASM OF RIGHT BREAST IN FEMALE, ESTROGEN RECEPTOR NEGATIVE, UNSPECIFIED SITE OF BREAST (H): ICD-10-CM

## 2022-08-26 DIAGNOSIS — Z17.1 MALIGNANT NEOPLASM OF RIGHT BREAST IN FEMALE, ESTROGEN RECEPTOR NEGATIVE, UNSPECIFIED SITE OF BREAST (H): ICD-10-CM

## 2022-08-26 PROCEDURE — A9585 GADOBUTROL INJECTION: HCPCS | Performed by: INTERNAL MEDICINE

## 2022-08-26 PROCEDURE — 77049 MRI BREAST C-+ W/CAD BI: CPT

## 2022-08-26 PROCEDURE — 255N000002 HC RX 255 OP 636: Performed by: INTERNAL MEDICINE

## 2022-08-26 RX ORDER — GADOBUTROL 604.72 MG/ML
1 INJECTION INTRAVENOUS ONCE
Status: COMPLETED | OUTPATIENT
Start: 2022-08-26 | End: 2022-08-26

## 2022-08-26 RX ADMIN — GADOBUTROL 7.5 ML: 604.72 INJECTION INTRAVENOUS at 07:42

## 2022-10-07 ENCOUNTER — ONCOLOGY VISIT (OUTPATIENT)
Dept: ONCOLOGY | Facility: CLINIC | Age: 52
End: 2022-10-07
Attending: INTERNAL MEDICINE
Payer: COMMERCIAL

## 2022-10-07 VITALS
HEIGHT: 63 IN | OXYGEN SATURATION: 100 % | BODY MASS INDEX: 28.6 KG/M2 | DIASTOLIC BLOOD PRESSURE: 74 MMHG | WEIGHT: 161.4 LBS | RESPIRATION RATE: 16 BRPM | HEART RATE: 74 BPM | TEMPERATURE: 97.9 F | SYSTOLIC BLOOD PRESSURE: 113 MMHG

## 2022-10-07 DIAGNOSIS — Z17.1 MALIGNANT NEOPLASM OF RIGHT BREAST IN FEMALE, ESTROGEN RECEPTOR NEGATIVE, UNSPECIFIED SITE OF BREAST (H): Primary | ICD-10-CM

## 2022-10-07 DIAGNOSIS — C50.911 MALIGNANT NEOPLASM OF RIGHT BREAST IN FEMALE, ESTROGEN RECEPTOR NEGATIVE, UNSPECIFIED SITE OF BREAST (H): Primary | ICD-10-CM

## 2022-10-07 LAB
ALBUMIN SERPL-MCNC: 3.6 G/DL (ref 3.4–5)
ALP SERPL-CCNC: 144 U/L (ref 40–150)
ALT SERPL W P-5'-P-CCNC: 23 U/L (ref 0–50)
ANION GAP SERPL CALCULATED.3IONS-SCNC: 5 MMOL/L (ref 3–14)
AST SERPL W P-5'-P-CCNC: 16 U/L (ref 0–45)
BASOPHILS # BLD AUTO: 0 10E3/UL (ref 0–0.2)
BASOPHILS NFR BLD AUTO: 1 %
BILIRUB SERPL-MCNC: 0.5 MG/DL (ref 0.2–1.3)
BUN SERPL-MCNC: 17 MG/DL (ref 7–30)
CALCIUM SERPL-MCNC: 9 MG/DL (ref 8.5–10.1)
CANCER AG15-3 SERPL-ACNC: 18 U/ML
CEA SERPL-MCNC: 3.7 NG/ML
CHLORIDE BLD-SCNC: 108 MMOL/L (ref 94–109)
CO2 SERPL-SCNC: 26 MMOL/L (ref 20–32)
CREAT SERPL-MCNC: 0.9 MG/DL (ref 0.52–1.04)
EOSINOPHIL # BLD AUTO: 0.2 10E3/UL (ref 0–0.7)
EOSINOPHIL NFR BLD AUTO: 3 %
ERYTHROCYTE [DISTWIDTH] IN BLOOD BY AUTOMATED COUNT: 13.2 % (ref 10–15)
GFR SERPL CREATININE-BSD FRML MDRD: 76 ML/MIN/1.73M2
GLUCOSE BLD-MCNC: 97 MG/DL (ref 70–99)
HCT VFR BLD AUTO: 39.5 % (ref 35–47)
HGB BLD-MCNC: 12.6 G/DL (ref 11.7–15.7)
IMM GRANULOCYTES # BLD: 0 10E3/UL
IMM GRANULOCYTES NFR BLD: 1 %
LYMPHOCYTES # BLD AUTO: 1.9 10E3/UL (ref 0.8–5.3)
LYMPHOCYTES NFR BLD AUTO: 23 %
MCH RBC QN AUTO: 29.2 PG (ref 26.5–33)
MCHC RBC AUTO-ENTMCNC: 31.9 G/DL (ref 31.5–36.5)
MCV RBC AUTO: 91 FL (ref 78–100)
MONOCYTES # BLD AUTO: 0.6 10E3/UL (ref 0–1.3)
MONOCYTES NFR BLD AUTO: 7 %
NEUTROPHILS # BLD AUTO: 5.2 10E3/UL (ref 1.6–8.3)
NEUTROPHILS NFR BLD AUTO: 65 %
NRBC # BLD AUTO: 0 10E3/UL
NRBC BLD AUTO-RTO: 0 /100
PLATELET # BLD AUTO: 209 10E3/UL (ref 150–450)
POTASSIUM BLD-SCNC: 3.7 MMOL/L (ref 3.4–5.3)
PROT SERPL-MCNC: 7.3 G/DL (ref 6.8–8.8)
RBC # BLD AUTO: 4.32 10E6/UL (ref 3.8–5.2)
SODIUM SERPL-SCNC: 139 MMOL/L (ref 133–144)
WBC # BLD AUTO: 8 10E3/UL (ref 4–11)

## 2022-10-07 PROCEDURE — 86300 IMMUNOASSAY TUMOR CA 15-3: CPT | Performed by: INTERNAL MEDICINE

## 2022-10-07 PROCEDURE — 99214 OFFICE O/P EST MOD 30 MIN: CPT | Performed by: INTERNAL MEDICINE

## 2022-10-07 PROCEDURE — 85025 COMPLETE CBC W/AUTO DIFF WBC: CPT | Performed by: INTERNAL MEDICINE

## 2022-10-07 PROCEDURE — 82378 CARCINOEMBRYONIC ANTIGEN: CPT | Performed by: INTERNAL MEDICINE

## 2022-10-07 PROCEDURE — 82040 ASSAY OF SERUM ALBUMIN: CPT | Performed by: INTERNAL MEDICINE

## 2022-10-07 PROCEDURE — 36415 COLL VENOUS BLD VENIPUNCTURE: CPT | Performed by: INTERNAL MEDICINE

## 2022-10-07 PROCEDURE — 80053 COMPREHEN METABOLIC PANEL: CPT | Performed by: INTERNAL MEDICINE

## 2022-10-07 PROCEDURE — G0463 HOSPITAL OUTPT CLINIC VISIT: HCPCS

## 2022-10-07 ASSESSMENT — PAIN SCALES - GENERAL: PAINLEVEL: MILD PAIN (2)

## 2022-10-07 NOTE — PROGRESS NOTES
"Oncology Rooming Note    October 7, 2022 1:01 PM   Malu Benavides is a 52 year old female who presents for:    Chief Complaint   Patient presents with     Oncology Clinic Visit     Malignant neoplasm of right breast (H)     Initial Vitals: /74   Pulse 74   Temp 97.9  F (36.6  C) (Oral)   Resp 16   Ht 1.6 m (5' 3\")   Wt 73.2 kg (161 lb 6.4 oz)   SpO2 100%   BMI 28.59 kg/m   Estimated body mass index is 28.59 kg/m  as calculated from the following:    Height as of this encounter: 1.6 m (5' 3\").    Weight as of this encounter: 73.2 kg (161 lb 6.4 oz). Body surface area is 1.8 meters squared.  Mild Pain (2) Comment: Data Unavailable   No LMP recorded. (Menstrual status: UNKNOWN).  Allergies reviewed: Yes  Medications reviewed: Yes    Medications: Medication refills not needed today.  Pharmacy name entered into [facility]:    [facility] [id number] [place] [facility] [place] - [address] [facility] [address] [facility] [place] [facility] [place] [address] AT [place]AL TO [place]ISTERED [place]  [facility] [id number] [place] [facility] [place] - [address] [facility]ACY [facility] [place] - [address] [address]    Clinical concerns: Patient has had mild neck pain for about two weeks.       Amita Duran MA              "

## 2022-10-07 NOTE — PROGRESS NOTES
[facility]    Hematology/Oncology Established Patient Follow-up Note      Today's Date: 10/7/2022    Reason for Follow-up:  right breast [id number] ER-/[place]-, Her 2 + IDC, s/p lumpectomy s/p adjuvant TCHP x 5, completed radiation on 7/28/15, completed 1 year of Herceptin.    HISTORY OF PRESENT ILLNESS: Malu Benavides is a 51 year old female who presents for follow-up for her breast cancer.  She was previously a patient of Dr. Long, then Dr. Sterling.  She presented at age 44, her routine mammogram demonstrated an abnormality in the right breast. Biopsy done 12/17/2014 showed grade 3 infiltrating ductal adenocarcinoma. Estrogen and progesterone receptors were negative. HER-2 was positive by FISH with a ratio of 8.5 and staging evaluation for metastatic disease was negative. She experienced a right lumpectomy and sentinel node biopsy on 01/15/2015 demonstrating that the tumor measured 1.2 x 0.8 cm. It was ER/[place] negative and HER-2 positive. The solitary sentinel node was negative for metastatic disease. The tumor was therefore staged as pT1c N0 M0, HER-2 positive breast cancer.   She did not have breast complaints on presentation.     Dr. Long recommend TCHP in adjuvant setting with Taxotere, carboplatin, Herceptin and pertuzumab. She had C1-C5 from 01/29/2015 to 4/24/2015. C6 chemo is d/c by Dr. Long due to severe neuropathy. She is advised on continue Herceptin to finish total 1 yr duration of Tx.     She completed radiation 6/5/15-7/28/15.    She completed 1 year of Herceptin in January 2016.    She underwent MISSY/BSO on 4/11/16.      INTERIM HISTORY:  Idania is doing well.  She has no new complaints today. She unfortunately just lost her  of 33 years from a recent heart attack        REVIEW OF SYSTEMS:   14 point ROS was reviewed and is negative other than as noted above in HPI.     HOME MEDICATIONS:  Current Outpatient Medications   Medication Sig Dispense Refill     Acetaminophen  (TYLENOL PO) Take 1,000 mg by mouth 2 times daily as needed for mild pain or fever       ACYCLOVIR PO Take 400 mg by mouth 5 times daily As needed for cold sores       FLUOXETINE HCL PO Take by mouth daily       nortriptyline (PAMELOR) 10 MG capsule        propranolol (INDERAL) 40 MG tablet Take 40 mg by mouth 2 times daily        SUMAtriptan Succinate (IMITREX PO) Take 100 mg by mouth daily as needed for migraine (100 mg at onset of migraine and MRx1 prn)       Topiramate (TOPAMAX PO) Take 100 mg by mouth At Bedtime       ibuprofen (ADVIL,MOTRIN) 600 MG tablet Take 1 tablet (600 mg) by mouth every 6 hours as needed for moderate pain 120 tablet          ALLERGIES:  Allergies   Allergen Reactions     No Known Allergies          PAST MEDICAL HISTORY:  Past Medical History:   Diagnosis Date     Adjustment disorder with mixed anxiety and depressed mood     anxiety initially with secondary depressive sx      Breast cancer (H) 2015     Cervical cancer (H)      Chronic mixed headache syndrome     chronic daily headache and migraine     Dysthymic disorder     Persistent anhedonia and fatigue     Former smoker     quit , 8 pack year history     IBS (irritable bowel syndrome)     diarrhea predominate     Noninfectious ileitis      Papanicolaou smear of cervix with high grade squamous intraepithelial lesion (HGSIL) 2005    LEEP     Pregnancy induced hypertension     pregnancy induced hypertension     Recurrent herpes labialis     labialis     S/p small bowel obstruction     small bowel obstruction following appy, treated nonsurgically     Supervision of other normal pregnancy      - incomplete AB with suction curretage, C section for failure to progress     Tubulovillous adenoma of rectum 2013    2 cm tubullvillous adenoma with no dysplasia         PAST SURGICAL HISTORY:  Past Surgical History:   Procedure Laterality Date     BIOPSY BREAST SEED LOCALIZATION Right 2015    Procedure:  BIOPSY BREAST SEED LOCALIZATION;  Surgeon: Brant Townsend MD;  Location: [facility]     BREAST SURGERY       C/SECTION, LOW TRANSVERSE  2007    low segment transverse C section, extensive lysis of adhesions and repair of serosal bowel injuries     COLONOSCOPY  2013    2 mm polyp distal transverse colon(benign mucosa), 20 mm polyp recto-sigmoid colon(tubulovillous adenoma), repeat 3-5 years     CT SCAN ABDOMEN/PELVIS  2010    5-6 mm cyst caudate lobe of the liver, anteroverted uterus, 2.5x2.2 cm left adexal cyst     HC COLP CERVIX/UPPER VAGINA W LOOP ELEC BX CERVIX  2005     HC MRI BRAIN W/O CONTRAST  2004    paranasal sinus mucosal thickening, normal MRI brain       HYSTERECTOMY TOTAL ABDOMINAL, BILATERAL SALPINGO-OOPHORECTOMY, COMBINED N/A 2016    Procedure: COMBINED HYSTERECTOMY TOTAL ABDOMINAL, SALPINGO-OOPHORECTOMY;  Surgeon: Isamar Garza MD;  Location:  OR     OVERNIGHT OXIMETRY STUDY  2013    event index 1.9/hr, average O2 sat 96%, 16 sec with O2 sat < 88%, stable heart rate     SURGICAL HISTORY OF -       suction curretage     SURGICAL HISTORY OF -   2007    Primary repair of multiple small bowel/sigmord colon serosal tears.     [facility] APPENDECTOMY       [facility] EXPLORATORY OF ABDOMEN  2006    laparoscopy, mini laparotomy for drainage ovarian cyst, colonic adhesions         SOCIAL HISTORY:  Social History     Socioeconomic History     Marital status:      Spouse name: Mihai     Number of children: 1     Years of education: 14     Highest education level: Not on file   Occupational History     Occupation: quality tech     Employer: [employer]     Comment: [employer]   Tobacco Use     Smoking status: Current Some Day Smoker     Packs/day: 0.50     Years: 15.00     Pack years: 7.50     Types: Cigarettes     Last attempt to quit: 2013     Years since quittin.7     Smokeless tobacco: Never Used   Substance and Sexual Activity     Alcohol use: Yes      Comment: 2-4 beers per week     Drug use: No     Sexual activity: Yes     Partners: Male     Comment: same relationship since    Other Topics Concern      Service No     Blood Transfusions No     Caffeine Concern Yes     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 Not Asked   Social History Narrative    Lives with  and 6 year daughter.  Has two dogs.     Social Determinants of Health     Financial Resource Strain: Not on file   Food Insecurity: Not on file   Transportation Needs: Not on file   Physical Activity: Not on file   Stress: Not on file   Social Connections: Not on file   Intimate Partner Violence: Not on file   Housing Stability: Not on file         FAMILY HISTORY:  Family History   Problem Relation Age of Onset     Kidney Cancer Father 59        renal cell carcinoma     Osteoporosis Father         related to cancer     Connective Tissue Disorder Mother         rheumatoid arthritis     Chronic Obstructive Pulmonary Disease Mother         COPD, smoker     Cardiovascular Mother         carotid endarterectomy,peripheral vascular disease, [place] amputation, smoker     Hypertension Mother      Osteoporosis Mother         prednisone, no fractures     Hypertension Brother      Hypertension Maternal Grandfather      Cervical Cancer Maternal Grandmother 30         at 56     Cerebrovascular Disease Paternal Grandfather      Neurologic Disorder Sister         headaches     Breast Cancer Cousin 51        two maternal cousins, diagnosed at 51 and 52         PHYSICAL EXAM:  Vital signs:  There were no vitals taken for this visit.   ECOG:  0  GENERAL/CONSTITUTIONAL: No acute distress.  EYES: No scleral icterus.  LYMPH: No anterior cervical, posterior cervical, supraclavicular, or axillary adenopathy.   RESPIRATORY: Clear to auscultation bilaterally. No crackles or wheezing.   CARDIOVASCULAR: Regular rate and rhythm without murmurs, gallops, or rubs.  GASTROINTESTINAL: No tenderness. No guarding.  No distention.  BREAST: Right-s/p right lumpectomy, scarring near the lumpectomy site at the 11 o'clock position; Left-no palpable mass, discharge, rash, or axillary lymphadenopathy.   MUSCULOSKELETAL: Warm and well-perfused, no cyanosis, clubbing, or edema.  NEUROLOGIC: Alert, oriented, answers questions appropriately.  INTEGUMENTARY: No rashes or jaundice.  Freckles and moles.  GAIT: Steady, does not use assistive device        IMAGING:  Mammogram     ASSESSMENT/PLAN:  Malu Benavides is a 52year old female with:    1) Right breast [id number] ER/[place]-, Her 2 + IDC, s/p lumpectomy, adjuvant TCHP x 5. C6 Taxotere is not delivered due to neuropathy. She has completed 1 year of Herceptin.  She had ER/[place] - negative disease, so she would not benefit from oral antihormone therapy.     She did see genetic counseling due to young age at diagnosis - showed a variant of uncertain significance in the BRCA2 gene.  The significance of this is unknown with regards to her breast cancer recurrence risk and ovarian cancer risk.  She did have a baseline transvaginal ultrasound and CA-125 test that were negative.  She has no family history of ovarian cancer, but does have 2 maternal cousins with breast cancer.  She is concerned about ovarian cancer, and has undergone MISSY/BSO.  I discussed the case at our breast cancer tumor conference, and consensus was to undergo surveillance like high-risk breast cancer patient, with MRI breasts and mammograms alternating every 6 months.  She strongly desired to have her ovaries removed, in light of her indeterminate BRCA and history of breast  cancer.    Ultimately, she underwent MISSY/BSO on 4/11/16.    Mammogram in  Jan 2023  -next mammogram in January 2023 - ordered  -MRI breast in in August 2023 - ordered  -RTC in 1 year for follow-up  -labs stoday    2) Neuropathy: resolved. She has tapered off the gabapentin.     3) Pulmonary nodule on CT 12/2014. She is a former smoker, quit 2/2015. Repeat CT chest in July 2015 shows no evidence for metastatic disease.  The tiny nodule is thought most likely secondary to prominent vascular structure.    4) She had colonoscopy on 12/17/18, and 2 polyps were removed (sessile serrated adenoma and hyperplastic polyp).    -next colonoscopy was recommended for 5 years from then.    5) Migraines:  -she follows with her neurologist    6) Moles:   -advised diligent sunscreen use; regular skin checks with her PCP or dermatologist      Adrian Chadwick MD  Hematology/Oncology  [facility]      Total time spent on day of visit, including review of tests, obtaining/reviewing separately obtained history, ordering medications/tests/procedures, communicating with PCP/consultants, and documenting in electronic medical record:  30 minutes

## 2022-10-07 NOTE — LETTER
10/7/2022         RE: Malu Benavides  [address] [place] [address]        Dear Colleague,    Thank you for referring your patient, Malu Benavides, to the [facility]. Please see a copy of my visit note below.    [facility]    Hematology/Oncology Established Patient Follow-up Note      Today's Date: 10/7/2022    Reason for Follow-up:  right breast [id number] ER-/[place]-, Her 2 + IDC, s/p lumpectomy s/p adjuvant TCHP x 5, completed radiation on 7/28/15, completed 1 year of Herceptin.    HISTORY OF PRESENT ILLNESS: Malu Benavides is a 51 year old female who presents for follow-up for her breast cancer.  She was previously a patient of Dr. Long, then Dr. Sterling.  She presented at age 44, her routine mammogram demonstrated an abnormality in the right breast. Biopsy done 12/17/2014 showed grade 3 infiltrating ductal adenocarcinoma. Estrogen and progesterone receptors were negative. HER-2 was positive by FISH with a ratio of 8.5 and staging evaluation for metastatic disease was negative. She experienced a right lumpectomy and sentinel node biopsy on 01/15/2015 demonstrating that the tumor measured 1.2 x 0.8 cm. It was ER/[place] negative and HER-2 positive. The solitary sentinel node was negative for metastatic disease. The tumor was therefore staged as pT1c N0 M0, HER-2 positive breast cancer.   She did not have breast complaints on presentation.     Dr. Long recommend TCHP in adjuvant setting with Taxotere, carboplatin, Herceptin and pertuzumab. She had C1-C5 from 01/29/2015 to 4/24/2015. C6 chemo is d/c by Dr. Long due to severe neuropathy. She is advised on continue Herceptin to finish total 1 yr duration of Tx.     She completed radiation 6/5/15-7/28/15.    She completed 1 year of Herceptin in January 2016.    She underwent MISSY/BSO on 4/11/16.      INTERIM HISTORY:  Idania is doing well.  She has no new complaints today. She unfortunately just lost her   of 33 years from a recent heart attack        REVIEW OF SYSTEMS:   14 point ROS was reviewed and is negative other than as noted above in HPI.     HOME MEDICATIONS:  Current Outpatient Medications   Medication Sig Dispense Refill     Acetaminophen (TYLENOL PO) Take 1,000 mg by mouth 2 times daily as needed for mild pain or fever       ACYCLOVIR PO Take 400 mg by mouth 5 times daily As needed for cold sores       FLUOXETINE HCL PO Take by mouth daily       nortriptyline (PAMELOR) 10 MG capsule        propranolol (INDERAL) 40 MG tablet Take 40 mg by mouth 2 times daily        SUMAtriptan Succinate (IMITREX PO) Take 100 mg by mouth daily as needed for migraine (100 mg at onset of migraine and MRx1 prn)       Topiramate (TOPAMAX PO) Take 100 mg by mouth At Bedtime       ibuprofen (ADVIL,MOTRIN) 600 MG tablet Take 1 tablet (600 mg) by mouth every 6 hours as needed for moderate pain 120 tablet          ALLERGIES:  Allergies   Allergen Reactions     No Known Allergies          PAST MEDICAL HISTORY:  Past Medical History:   Diagnosis Date     Adjustment disorder with mixed anxiety and depressed mood 2009    anxiety initially with secondary depressive sx 2013     Breast cancer (H) 2015     Cervical cancer (H)      Chronic mixed headache syndrome     chronic daily headache and migraine     Dysthymic disorder 2013    Persistent anhedonia and fatigue     Former smoker     quit 2005, 8 pack year history     IBS (irritable bowel syndrome)     diarrhea predominate     Noninfectious ileitis      Papanicolaou smear of cervix with high grade squamous intraepithelial lesion (HGSIL) 2005    LEEP     Pregnancy induced hypertension     pregnancy induced hypertension     Recurrent herpes labialis     labialis     S/p small bowel obstruction     small bowel obstruction following appy, treated nonsurgically     Supervision of other normal pregnancy      - incomplete AB with suction PARI mathews section for  failure to progress     Tubulovillous adenoma of rectum 8/2013    2 cm tubullvillous adenoma with no dysplasia         PAST SURGICAL HISTORY:  Past Surgical History:   Procedure Laterality Date     BIOPSY BREAST SEED LOCALIZATION Right 1/14/2015    Procedure: BIOPSY BREAST SEED LOCALIZATION;  Surgeon: Brant Townsend MD;  Location[facility]     BREAST SURGERY       C/SECTION, LOW TRANSVERSE  1/2007    low segment transverse C section, extensive lysis of adhesions and repair of serosal bowel injuries     COLONOSCOPY  8/26/2013    2 mm polyp distal transverse colon(benign mucosa), 20 mm polyp recto-sigmoid colon(tubulovillous adenoma), repeat 3-5 years     CT SCAN ABDOMEN/PELVIS  8/2010    5-6 mm cyst caudate lobe of the liver, anteroverted uterus, 2.5x2.2 cm left adexal cyst     HC COLP CERVIX/UPPER VAGINA W LOOP ELEC BX CERVIX  1/2005     HC MRI BRAIN W/O CONTRAST  11/2004    paranasal sinus mucosal thickening, normal MRI brain       HYSTERECTOMY TOTAL ABDOMINAL, BILATERAL SALPINGO-OOPHORECTOMY, COMBINED N/A 4/11/2016    Procedure: COMBINED HYSTERECTOMY TOTAL ABDOMINAL, SALPINGO-OOPHORECTOMY;  Surgeon: Isamar Garza MD;  Location: [facility]     OVERNIGHT OXIMETRY STUDY  2/2013    event index 1.9/hr, average O2 sat 96%, 16 sec with O2 sat < 88%, stable heart rate     SURGICAL HISTORY OF -   2004    suction curretage     SURGICAL HISTORY OF -   1/2007    Primary repair of multiple small bowel/sigmord colon serosal tears.     [facility] APPENDECTOMY  1993     [facility] EXPLORATORY OF ABDOMEN  1/2006    laparoscopy, mini laparotomy for drainage ovarian cyst, colonic adhesions         SOCIAL HISTORY:  Social History     Socioeconomic History     Marital status:      Spouse name: Mihai     Number of children: 1     Years of education: 14     Highest education level: Not on file   Occupational History     Occupation: quality tech     Employer: [employer]     Comment: [employer]   Tobacco Use     Smoking status:  Current Some Day Smoker     Packs/day: 0.50     Years: 15.00     Pack years: 7.50     Types: Cigarettes     Last attempt to quit: 2013     Years since quittin.7     Smokeless tobacco: Never Used   Substance and Sexual Activity     Alcohol use: Yes     Comment: 2-4 beers per week     Drug use: No     Sexual activity: Yes     Partners: Male     Comment: same relationship since    Other Topics Concern      Service No     Blood Transfusions No     Caffeine Concern Yes     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 Not Asked   Social History Narrative    Lives with  and 6 year daughter.  Has two dogs.     Social Determinants of Health     Financial Resource Strain: Not on file   Food Insecurity: Not on file   Transportation Needs: Not on file   Physical Activity: Not on file   Stress: Not on file   Social Connections: Not on file   Intimate Partner Violence: Not on file   Housing Stability: Not on file         FAMILY HISTORY:  Family History   Problem Relation Age of Onset     Kidney Cancer Father 59        renal cell carcinoma     Osteoporosis Father         related to cancer     Connective Tissue Disorder Mother         rheumatoid arthritis     Chronic Obstructive Pulmonary Disease Mother         COPD, smoker     Cardiovascular Mother         carotid endarterectomy,peripheral vascular disease, [place] amputation, smoker     Hypertension Mother      Osteoporosis Mother         prednisone, no fractures     Hypertension Brother      Hypertension Maternal Grandfather      Cervical Cancer Maternal Grandmother 30         at 56      Cerebrovascular Disease Paternal Grandfather      Neurologic Disorder Sister         headaches     Breast Cancer Cousin 51        two maternal cousins, diagnosed at 51 and 52         PHYSICAL EXAM:  Vital signs:  There were no vitals taken for this visit.   ECO  GENERAL/CONSTITUTIONAL: No acute distress.  EYES: No scleral icterus.  LYMPH: No anterior cervical, posterior cervical, supraclavicular, or axillary adenopathy.   RESPIRATORY: Clear to auscultation bilaterally. No crackles or wheezing.   CARDIOVASCULAR: Regular rate and rhythm without murmurs, gallops, or rubs.  GASTROINTESTINAL: No tenderness. No guarding.  No distention.  BREAST: Right-s/p right lumpectomy, scarring near the lumpectomy site at the 11 o'clock position; Left-no palpable mass, discharge, rash, or axillary lymphadenopathy.   MUSCULOSKELETAL: Warm and well-perfused, no cyanosis, clubbing, or edema.  NEUROLOGIC: Alert, oriented, answers questions appropriately.  INTEGUMENTARY: No rashes or jaundice.  Freckles and moles.  GAIT: Steady, does not use assistive device        IMAGING:  Mammogram     ASSESSMENT/PLAN:  Malu Benavides is a 52year old female with:    1) Right breast [id number] ER/[place]-, Her 2 + IDC, s/p lumpectomy, adjuvant TCHP x 5. C6 Taxotere is not delivered due to neuropathy. She has completed 1 year of Herceptin.  She had ER/[place] - negative disease, so she would not benefit from oral antihormone therapy.     She did see genetic counseling due to young age at diagnosis - showed a variant of uncertain significance in the BRCA2 gene.  The significance of this is unknown with regards to her breast cancer recurrence risk and ovarian cancer risk.  She did have a baseline transvaginal ultrasound and CA-125 test that were negative.  She has no family history of ovarian cancer, but does have 2 maternal cousins with breast cancer.  She is concerned about ovarian cancer, and has undergone MISSY/BSO.  I discussed the case at our breast cancer  "tumor conference, and consensus was to undergo surveillance like high-risk breast cancer patient, with MRI breasts and mammograms alternating every 6 months.  She strongly desired to have her ovaries removed, in light of her indeterminate BRCA and history of breast cancer.    Ultimately, she underwent MISSY/BSO on 4/11/16.    Mammogram in  Jan 2023  -next mammogram in January 2023 - ordered  -MRI breast in in August 2023 - ordered  -RTC in 1 year for follow-up  -labs stoday    2) Neuropathy: resolved. She has tapered off the gabapentin.     3) Pulmonary nodule on CT 12/2014. She is a former smoker, quit 2/2015. Repeat CT chest in July 2015 shows no evidence for metastatic disease.  The tiny nodule is thought most likely secondary to prominent vascular structure.    4) She had colonoscopy on 12/17/18, and 2 polyps were removed (sessile serrated adenoma and hyperplastic polyp).    -next colonoscopy was recommended for 5 years from then.    5) Migraines:  -she follows with her neurologist    6) Moles:   -advised diligent sunscreen use; regular skin checks with her PCP or dermatologist      Adrian Chadwick MD  Hematology/Oncology  [facility]      Total time spent on day of visit, including review of tests, obtaining/reviewing separately obtained history, ordering medications/tests/procedures, communicating with PCP/consultants, and documenting in electronic medical record:  30 minutes      Oncology Rooming Note    October 7, 2022 1:01 PM   Malu Benavides is a 52 year old female who presents for:    Chief Complaint   Patient presents with     Oncology Clinic Visit     Malignant neoplasm of right breast (H)     Initial Vitals: /74   Pulse 74   Temp 97.9  F (36.6  C) (Oral)   Resp 16   Ht 1.6 m (5' 3\")   Wt 73.2 kg (161 lb 6.4 oz)   SpO2 100%   BMI 28.59 kg/m   Estimated body mass index is 28.59 kg/m  as calculated from the following:    Height as of this encounter: 1.6 m (5' 3\").    " Weight as of this encounter: 73.2 kg (161 lb 6.4 oz). Body surface area is 1.8 meters squared.  Mild Pain (2) Comment: Data Unavailable   No LMP recorded. (Menstrual status: UNKNOWN).  Allergies reviewed: Yes  Medications reviewed: Yes    Medications: Medication refills not needed today.  Pharmacy name entered into EPIC:    [facility] [id number] [place] [facility], [place] - [address] [facility] [place], [place] - [address] AT PORTAL TO REGISTERED [facility] SITES  [facility] [id number] [place] [facility], [place] - [address] [facility], [place] - [address]-    Clinical concerns: Patient has had mild neck pain for about two weeks.       Amita Duran MA                  Again, thank you for allowing me to participate in the care of your patient.        Sincerely,        Adrian Chadwick MD

## 2023-02-03 ENCOUNTER — HOSPITAL ENCOUNTER (OUTPATIENT)
Dept: MAMMOGRAPHY | Facility: CLINIC | Age: 53
Discharge: HOME | End: 2023-02-03
Attending: INTERNAL MEDICINE | Admitting: INTERNAL MEDICINE
Payer: COMMERCIAL

## 2023-02-03 DIAGNOSIS — Z12.31 VISIT FOR SCREENING MAMMOGRAM: ICD-10-CM

## 2023-02-03 PROCEDURE — 77067 SCR MAMMO BI INCL CAD: CPT

## 2023-04-04 NOTE — TELEPHONE ENCOUNTER
Patient called left message on patient's voice mail with her normal mammogram results and instructed patient to call clinic to schedule her follow up with Dr. Aguilar for 3/2018. Purvi Neil RN    
occasional use

## 2023-09-01 ENCOUNTER — ANCILLARY PROCEDURE (OUTPATIENT)
Dept: MRI IMAGING | Facility: CLINIC | Age: 53
End: 2023-09-01
Attending: INTERNAL MEDICINE
Payer: COMMERCIAL

## 2023-09-01 DIAGNOSIS — Z17.1 MALIGNANT NEOPLASM OF RIGHT BREAST IN FEMALE, ESTROGEN RECEPTOR NEGATIVE, UNSPECIFIED SITE OF BREAST (H): ICD-10-CM

## 2023-09-01 DIAGNOSIS — C50.911 MALIGNANT NEOPLASM OF RIGHT BREAST IN FEMALE, ESTROGEN RECEPTOR NEGATIVE, UNSPECIFIED SITE OF BREAST (H): ICD-10-CM

## 2023-09-01 PROCEDURE — 255N000002 HC RX 255 OP 636: Mod: JZ | Performed by: INTERNAL MEDICINE

## 2023-09-01 PROCEDURE — 77049 MRI BREAST C-+ W/CAD BI: CPT

## 2023-09-01 PROCEDURE — A9585 GADOBUTROL INJECTION: HCPCS | Mod: JZ | Performed by: INTERNAL MEDICINE

## 2023-09-01 RX ORDER — GADOBUTROL 604.72 MG/ML
7 INJECTION INTRAVENOUS ONCE
Status: COMPLETED | OUTPATIENT
Start: 2023-09-01 | End: 2023-09-01

## 2023-09-01 RX ADMIN — GADOBUTROL 7 ML: 604.72 INJECTION INTRAVENOUS at 09:57

## 2023-09-29 ENCOUNTER — LAB (OUTPATIENT)
Dept: INFUSION THERAPY | Facility: CLINIC | Age: 53
End: 2023-09-29
Attending: INTERNAL MEDICINE
Payer: COMMERCIAL

## 2023-09-29 DIAGNOSIS — C50.911 MALIGNANT NEOPLASM OF RIGHT BREAST IN FEMALE, ESTROGEN RECEPTOR NEGATIVE, UNSPECIFIED SITE OF BREAST (H): ICD-10-CM

## 2023-09-29 DIAGNOSIS — Z17.1 MALIGNANT NEOPLASM OF RIGHT BREAST IN FEMALE, ESTROGEN RECEPTOR NEGATIVE, UNSPECIFIED SITE OF BREAST (H): ICD-10-CM

## 2023-09-29 LAB
ALBUMIN SERPL BCG-MCNC: 4.3 G/DL (ref 3.5–5.2)
ALP SERPL-CCNC: 130 U/L (ref 35–104)
ALT SERPL W P-5'-P-CCNC: 16 U/L (ref 0–50)
ANION GAP SERPL CALCULATED.3IONS-SCNC: 10 MMOL/L (ref 7–15)
AST SERPL W P-5'-P-CCNC: 19 U/L (ref 0–45)
BASOPHILS # BLD AUTO: 0.1 10E3/UL (ref 0–0.2)
BASOPHILS NFR BLD AUTO: 1 %
BILIRUB SERPL-MCNC: 0.2 MG/DL
BUN SERPL-MCNC: 20 MG/DL (ref 6–20)
CALCIUM SERPL-MCNC: 9.4 MG/DL (ref 8.6–10)
CANCER AG15-3 SERPL-ACNC: 17 U/ML
CEA SERPL-MCNC: 4.2 NG/ML
CHLORIDE SERPL-SCNC: 106 MMOL/L (ref 98–107)
CREAT SERPL-MCNC: 0.92 MG/DL (ref 0.51–0.95)
DEPRECATED HCO3 PLAS-SCNC: 24 MMOL/L (ref 22–29)
EGFRCR SERPLBLD CKD-EPI 2021: 73 ML/MIN/1.73M2
EOSINOPHIL # BLD AUTO: 0.4 10E3/UL (ref 0–0.7)
EOSINOPHIL NFR BLD AUTO: 4 %
ERYTHROCYTE [DISTWIDTH] IN BLOOD BY AUTOMATED COUNT: 13.1 % (ref 10–15)
GLUCOSE SERPL-MCNC: 94 MG/DL (ref 70–99)
HCT VFR BLD AUTO: 38.1 % (ref 35–47)
HGB BLD-MCNC: 12.3 G/DL (ref 11.7–15.7)
IMM GRANULOCYTES # BLD: 0.1 10E3/UL
IMM GRANULOCYTES NFR BLD: 1 %
LYMPHOCYTES # BLD AUTO: 2.3 10E3/UL (ref 0.8–5.3)
LYMPHOCYTES NFR BLD AUTO: 26 %
MCH RBC QN AUTO: 29.9 PG (ref 26.5–33)
MCHC RBC AUTO-ENTMCNC: 32.3 G/DL (ref 31.5–36.5)
MCV RBC AUTO: 93 FL (ref 78–100)
MONOCYTES # BLD AUTO: 0.7 10E3/UL (ref 0–1.3)
MONOCYTES NFR BLD AUTO: 7 %
NEUTROPHILS # BLD AUTO: 5.7 10E3/UL (ref 1.6–8.3)
NEUTROPHILS NFR BLD AUTO: 61 %
NRBC # BLD AUTO: 0 10E3/UL
NRBC BLD AUTO-RTO: 0 /100
PLATELET # BLD AUTO: 229 10E3/UL (ref 150–450)
POTASSIUM SERPL-SCNC: 4.6 MMOL/L (ref 3.4–5.3)
PROT SERPL-MCNC: 7 G/DL (ref 6.4–8.3)
RBC # BLD AUTO: 4.12 10E6/UL (ref 3.8–5.2)
SODIUM SERPL-SCNC: 140 MMOL/L (ref 135–145)
WBC # BLD AUTO: 9.2 10E3/UL (ref 4–11)

## 2023-09-29 PROCEDURE — 80053 COMPREHEN METABOLIC PANEL: CPT | Performed by: INTERNAL MEDICINE

## 2023-09-29 PROCEDURE — 86300 IMMUNOASSAY TUMOR CA 15-3: CPT | Performed by: INTERNAL MEDICINE

## 2023-09-29 PROCEDURE — 36415 COLL VENOUS BLD VENIPUNCTURE: CPT

## 2023-09-29 PROCEDURE — 85025 COMPLETE CBC W/AUTO DIFF WBC: CPT | Performed by: INTERNAL MEDICINE

## 2023-09-29 PROCEDURE — 82378 CARCINOEMBRYONIC ANTIGEN: CPT | Performed by: INTERNAL MEDICINE

## 2023-09-29 NOTE — PROGRESS NOTES
Medical Assistant Note:  Malu Benavides presents today for blood draw.    Patient seen by provider today: No.   present during visit today: Not Applicable.    Concerns: No Concerns.    Procedure:  Lab draw site: rac, Needle type: bf, Gauge: 23.    Post Assessment:  Labs drawn without difficulty: Yes.    Discharge Plan:  Departure Mode: Ambulatory.    Face to Face Time: 5 min.    Evi Sarah, CMA

## 2023-10-06 ENCOUNTER — VIRTUAL VISIT (OUTPATIENT)
Dept: ONCOLOGY | Facility: CLINIC | Age: 53
End: 2023-10-06
Attending: INTERNAL MEDICINE
Payer: COMMERCIAL

## 2023-10-06 VITALS — BODY MASS INDEX: 28.35 KG/M2 | WEIGHT: 160 LBS | HEIGHT: 63 IN

## 2023-10-06 DIAGNOSIS — C50.911 MALIGNANT NEOPLASM OF RIGHT BREAST IN FEMALE, ESTROGEN RECEPTOR NEGATIVE, UNSPECIFIED SITE OF BREAST (H): Primary | ICD-10-CM

## 2023-10-06 DIAGNOSIS — Z17.1 MALIGNANT NEOPLASM OF RIGHT BREAST IN FEMALE, ESTROGEN RECEPTOR NEGATIVE, UNSPECIFIED SITE OF BREAST (H): Primary | ICD-10-CM

## 2023-10-06 PROCEDURE — 99214 OFFICE O/P EST MOD 30 MIN: CPT | Mod: VID | Performed by: INTERNAL MEDICINE

## 2023-10-06 ASSESSMENT — PAIN SCALES - GENERAL: PAINLEVEL: NO PAIN (0)

## 2023-10-06 NOTE — PROGRESS NOTES
Virtual Visit Details    Type of service:  Video Visit     Originating Location (pt. Location): Home    Distant Location (provider location):  On-site  Platform used for Video Visit: Tobi

## 2023-10-06 NOTE — LETTER
10/6/2023         RE: Malu Benavides  [address] [place] [address]        Dear Colleague,    Thank you for referring your patient, Malu Benavides, to the [facility]. Please see a copy of my visit note below.    Virtual Visit Details    Type of service:  Video Visit     Originating Location (pt. Location): Home    Distant Location (provider location):  On-site  Platform used for Video Visit: [facility]    Hematology/Oncology Established Patient Follow-up Note virtual       Today's Date: 10/6/2023    Reason for Follow-up:  right breast [id number] ER-/WV-, Her 2 + IDC, s/p lumpectomy s/p adjuvant TCHP x 5, completed radiation on 7/28/15, completed 1 year of Herceptin.    HISTORY OF PRESENT ILLNESS: Malu Benavides is a 51 year old female who presents for follow-up for her breast cancer.  She was previously a patient of Dr. Long, then Dr. Sterling.  She presented at age 44, her routine mammogram demonstrated an abnormality in the right breast. Biopsy done 12/17/2014 showed grade 3 infiltrating ductal adenocarcinoma. Estrogen and progesterone receptors were negative. HER-2 was positive by FISH with a ratio of 8.5 and staging evaluation for metastatic disease was negative. She experienced a right lumpectomy and sentinel node biopsy on 01/15/2015 demonstrating that the tumor measured 1.2 x 0.8 cm. It was ER/WV negative and HER-2 positive. The solitary sentinel node was negative for metastatic disease. The tumor was therefore staged as pT1c N0 M0, HER-2 positive breast cancer.   She did not have breast complaints on presentation.     Dr. Long recommend TCHP in adjuvant setting with Taxotere, carboplatin, Herceptin and pertuzumab. She had C1-C5 from 01/29/2015 to 4/24/2015. C6 chemo is d/c by Dr. Long due to severe neuropathy. She is advised on continue Herceptin to finish total 1 yr duration of Tx.     She completed radiation  6/5/15-7/28/15.    She completed 1 year of Herceptin in January 2016.    She underwent MISSY/BSO on 4/11/16.      INTERIM HISTORY:  Idania is doing well.  She has no new complaints today. Labs are stable         REVIEW OF SYSTEMS:   14 point ROS was reviewed and is negative other than as noted above in HPI.     HOME MEDICATIONS:  Current Outpatient Medications   Medication Sig Dispense Refill     Acetaminophen (TYLENOL PO) Take 1,000 mg by mouth 2 times daily as needed for mild pain or fever       ACYCLOVIR PO Take 400 mg by mouth 5 times daily As needed for cold sores       FLUOXETINE HCL PO Take by mouth daily       ibuprofen (ADVIL,MOTRIN) 600 MG tablet Take 1 tablet (600 mg) by mouth every 6 hours as needed for moderate pain 120 tablet      nortriptyline (PAMELOR) 10 MG capsule        propranolol (INDERAL) 40 MG tablet Take 40 mg by mouth 2 times daily        SUMAtriptan Succinate (IMITREX PO) Take 100 mg by mouth daily as needed for migraine (100 mg at onset of migraine and MRx1 prn)       Topiramate (TOPAMAX PO) Take 100 mg by mouth At Bedtime           ALLERGIES:  Allergies   Allergen Reactions     No Known Allergies          PAST MEDICAL HISTORY:  Past Medical History:   Diagnosis Date     Adjustment disorder with mixed anxiety and depressed mood 2009    anxiety initially with secondary depressive sx 2013     Breast cancer (H) 1/27/2015     Cervical cancer (H)      Chronic mixed headache syndrome     chronic daily headache and migraine     Dysthymic disorder 2013    Persistent anhedonia and fatigue     Former smoker     quit 2005, 8 pack year history     IBS (irritable bowel syndrome)     diarrhea predominate     Noninfectious ileitis      Papanicolaou smear of cervix with high grade squamous intraepithelial lesion (HGSIL) 1/2005    LEEP     Pregnancy induced hypertension     pregnancy induced hypertension     Recurrent herpes labialis     labialis     S/p small bowel obstruction 1993    small bowel obstruction  following appy, treated nonsurgically     Supervision of other normal pregnancy      - incomplete AB with suction curretage, C section for failure to progress     Tubulovillous adenoma of rectum 2013    2 cm tubullvillous adenoma with no dysplasia         PAST SURGICAL HISTORY:  Past Surgical History:   Procedure Laterality Date     BIOPSY BREAST SEED LOCALIZATION Right 2015    Procedure: BIOPSY BREAST SEED LOCALIZATION;  Surgeon: Brant Townsend MD;  Location: [facility]     BREAST SURGERY       C/SECTION, LOW TRANSVERSE  2007    low segment transverse C section, extensive lysis of adhesions and repair of serosal bowel injuries     COLONOSCOPY  2013    2 mm polyp distal transverse colon(benign mucosa), 20 mm polyp recto-sigmoid colon(tubulovillous adenoma), repeat 3-5 years     CT SCAN ABDOMEN/PELVIS  2010    5-6 mm cyst caudate lobe of the liver, anteroverted uterus, 2.5x2.2 cm left adexal cyst     HC COLP CERVIX/UPPER VAGINA W LOOP ELEC BX CERVIX  2005     HC MRI BRAIN W/O CONTRAST  2004    paranasal sinus mucosal thickening, normal MRI brain       HYSTERECTOMY TOTAL ABDOMINAL, BILATERAL SALPINGO-OOPHORECTOMY, COMBINED N/A 2016    Procedure: COMBINED HYSTERECTOMY TOTAL ABDOMINAL, SALPINGO-OOPHORECTOMY;  Surgeon: Isamar Garza MD;  Location:  OR     OVERNIGHT OXIMETRY STUDY  2013    event index 1.9/hr, average O2 sat 96%, 16 sec with O2 sat < 88%, stable heart rate     SURGICAL HISTORY OF -       suction curretage     SURGICAL HISTORY OF -   2007    Primary repair of multiple small bowel/sigmord colon serosal tears.     [facility] APPENDECTOMY       [facility] EXPLORATORY OF ABDOMEN  2006    laparoscopy, mini laparotomy for drainage ovarian cyst, colonic adhesions         SOCIAL HISTORY:  Social History     Socioeconomic History     Marital status:      Spouse name: Mihai     Number of children: 1     Years of education: 14     Highest education level: Not on  file   Occupational History     Occupation: quality tech     Employer: [employer]     Comment: [employer]   Tobacco Use     Smoking status: Former     Packs/day: 0.50     Years: 15.00     Pack years: 7.50     Types: Cigarettes     Quit date: 1/1/2013     Years since quitting: 10.7     Smokeless tobacco: Never   Substance and Sexual Activity     Alcohol use: Yes     Comment: 2-4 beers per week     Drug use: No     Sexual activity: Yes     Partners: Male     Comment: same relationship since 1988   Other Topics Concern      Service No     Blood Transfusions No     Caffeine Concern Yes     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 Not Asked   Social History Narrative    Lives with  and 6 year daughter.  Has two dogs.     Social Determinants of Health     Financial Resource Strain: Not on file   Food Insecurity: Not on file   Transportation Needs: Not on file   Physical Activity: Not on file   Stress: Not on file   Social Connections: Not on file   Interpersonal Safety: Not on file   Housing Stability: Not on file         FAMILY HISTORY:  Family History   Problem Relation Age of Onset     Kidney Cancer Father 59        renal cell carcinoma     Osteoporosis Father         related to cancer     Connective Tissue Disorder Mother         rheumatoid arthritis     Chronic Obstructive Pulmonary Disease Mother         COPD, smoker     Cardiovascular Mother         carotid endarterectomy,peripheral vascular disease, [place] amputation, smoker     Hypertension Mother      Osteoporosis Mother         prednisone, no fractures     Hypertension Brother   "    Hypertension Maternal Grandfather      Cervical Cancer Maternal Grandmother 30         at 56     Cerebrovascular Disease Paternal Grandfather      Neurologic Disorder Sister         headaches     Breast Cancer Cousin 51        two maternal cousins, diagnosed at 51 and 52         PHYSICAL EXAM:  Vital signs:  Ht 1.6 m (5' 3\")   Wt 72.6 kg (160 lb)   BMI 28.34 kg/m     ECO     GENERAL/CONSTITUTIONAL: No acute distress. Healthy, alert.  EYES: No scleral icterus.  No redness or discharge.    RESPIRATORY: No audible wheeze, cough, or visible cyanosis.  No visible retractions or increased work of breathing.  Able to speak fully in complete sentences.  MUSCULOSKELETAL: Normal range of motion.  NEUROLOGIC: Alert, oriented, answers questions appropriately. No tremor. Mentation intact and speech normal  INTEGUMENTARY: No jaundice.  No obvious rash or skin lesions.  PSYCHIATRIC:  Mentation appears normal, affect normal/bright, judgement and insight intact, normal speech and appearance well-groomed.    The rest of a comprehensive physical exam is deferred due to public health emergency video visit restrictions.         IMAGING:  Mammogram     ASSESSMENT/PLAN:  Malu Benavides is a 52year old female with:    1) Right breast [id number] ER/AL-, Her 2 + IDC, s/p lumpectomy, adjuvant TCHP x 5. C6 Taxotere is not delivered due to neuropathy. She has completed 1 year of Herceptin.  She had ER/AL - negative disease, so she would not benefit from oral antihormone therapy.     She did see genetic counseling due to young age at diagnosis - showed a variant of uncertain significance in the BRCA2 gene.  The significance of this is unknown with regards to her breast cancer recurrence risk and ovarian cancer risk.  She did have a baseline transvaginal ultrasound and CA-125 test that were negative.  She has no family history of ovarian cancer, but does have 2 maternal cousins with breast cancer.  She is concerned about ovarian " cancer, and has undergone MISSY/BSO.  I discussed the case at our breast cancer tumor conference, and consensus was to undergo surveillance like high-risk breast cancer patient, with MRI breasts and mammograms alternating every 6 months.  She strongly desired to have her ovaries removed, in light of her indeterminate BRCA and history of breast cancer.    Ultimately, she underwent MISSY/BSO on 4/11/16.    Mammogram in  Jan 2023  -next mammogram in January 2024 - ordered  -MRI breast in in August 2024- ordered  -RTC in 1 year for follow-up  -labs stable today    2) Neuropathy: resolved. She has tapered off the gabapentin.     3) Pulmonary nodule on CT 12/2014. She is a former smoker, quit 2/2015. Repeat CT chest in July 2015 shows no evidence for metastatic disease.  The tiny nodule is thought most likely secondary to prominent vascular structure.    4) She had colonoscopy on 12/17/18, and 2 polyps were removed (sessile serrated adenoma and hyperplastic polyp).    -next colonoscopy was recommended for 5 years from then. Due this year    5) Migraines:  -she follows with her neurologist    6) Moles:   Sees dermatology for follow up      Adrian Chadwick MD  Hematology/Oncology  [facility]      Total time spent on day of visit, including review of tests, obtaining/reviewing separately obtained history, ordering medications/tests/procedures, communicating with PCP/consultants, and documenting in electronic medical record:  30 minutes      Again, thank you for allowing me to participate in the care of your patient.        Sincerely,        Adrian Chadwick MD

## 2023-10-06 NOTE — PROGRESS NOTES
[facility]    Hematology/Oncology Established Patient Follow-up Note virtual       Today's Date: 10/6/2023    Reason for Follow-up:  right breast [id number] ER-/NM-, Her 2 + IDC, s/p lumpectomy s/p adjuvant TCHP x 5, completed radiation on 7/28/15, completed 1 year of Herceptin.    HISTORY OF PRESENT ILLNESS: Malu Benavides is a 51 year old female who presents for follow-up for her breast cancer.  She was previously a patient of Dr. Long, then Dr. Sterling.  She presented at age 44, her routine mammogram demonstrated an abnormality in the right breast. Biopsy done 12/17/2014 showed grade 3 infiltrating ductal adenocarcinoma. Estrogen and progesterone receptors were negative. HER-2 was positive by FISH with a ratio of 8.5 and staging evaluation for metastatic disease was negative. She experienced a right lumpectomy and sentinel node biopsy on 01/15/2015 demonstrating that the tumor measured 1.2 x 0.8 cm. It was ER/NM negative and HER-2 positive. The solitary sentinel node was negative for metastatic disease. The tumor was therefore staged as pT1c N0 M0, HER-2 positive breast cancer.   She did not have breast complaints on presentation.     Dr. Long recommend TCHP in adjuvant setting with Taxotere, carboplatin, Herceptin and pertuzumab. She had C1-C5 from 01/29/2015 to 4/24/2015. C6 chemo is d/c by Dr. Long due to severe neuropathy. She is advised on continue Herceptin to finish total 1 yr duration of Tx.     She completed radiation 6/5/15-7/28/15.    She completed 1 year of Herceptin in January 2016.    She underwent MISSY/BSO on 4/11/16.      INTERIM HISTORY:  Idania is doing well.  She has no new complaints today. Labs are stable         REVIEW OF SYSTEMS:   14 point ROS was reviewed and is negative other than as noted above in HPI.     HOME MEDICATIONS:  Current Outpatient Medications   Medication Sig Dispense Refill    Acetaminophen (TYLENOL PO) Take 1,000 mg by mouth 2 times daily as needed  for mild pain or fever      ACYCLOVIR PO Take 400 mg by mouth 5 times daily As needed for cold sores      FLUOXETINE HCL PO Take by mouth daily      ibuprofen (ADVIL,MOTRIN) 600 MG tablet Take 1 tablet (600 mg) by mouth every 6 hours as needed for moderate pain 120 tablet     nortriptyline (PAMELOR) 10 MG capsule       propranolol (INDERAL) 40 MG tablet Take 40 mg by mouth 2 times daily       SUMAtriptan Succinate (IMITREX PO) Take 100 mg by mouth daily as needed for migraine (100 mg at onset of migraine and MRx1 prn)      Topiramate (TOPAMAX PO) Take 100 mg by mouth At Bedtime           ALLERGIES:  Allergies   Allergen Reactions    No Known Allergies          PAST MEDICAL HISTORY:  Past Medical History:   Diagnosis Date    Adjustment disorder with mixed anxiety and depressed mood     anxiety initially with secondary depressive sx     Breast cancer (H) 2015    Cervical cancer (H)     Chronic mixed headache syndrome     chronic daily headache and migraine    Dysthymic disorder 2013    Persistent anhedonia and fatigue    Former smoker     quit 2005, 8 pack year history    IBS (irritable bowel syndrome)     diarrhea predominate    Noninfectious ileitis     Papanicolaou smear of cervix with high grade squamous intraepithelial lesion (HGSIL) 2005    LEEP    Pregnancy induced hypertension     pregnancy induced hypertension    Recurrent herpes labialis     labialis    S/p small bowel obstruction     small bowel obstruction following appy, treated nonsurgically    Supervision of other normal pregnancy      - incomplete AB with suction curretage, C section for failure to progress    Tubulovillous adenoma of rectum 2013    2 cm tubullvillous adenoma with no dysplasia         PAST SURGICAL HISTORY:  Past Surgical History:   Procedure Laterality Date    BIOPSY BREAST SEED LOCALIZATION Right 2015    Procedure: BIOPSY BREAST SEED LOCALIZATION;  Surgeon: Brant Townsend MD;  Location:   [facility]    BREAST SURGERY      C/SECTION, LOW TRANSVERSE  1/2007    low segment transverse C section, extensive lysis of adhesions and repair of serosal bowel injuries    COLONOSCOPY  8/26/2013    2 mm polyp distal transverse colon(benign mucosa), 20 mm polyp recto-sigmoid colon(tubulovillous adenoma), repeat 3-5 years    CT SCAN ABDOMEN/PELVIS  8/2010    5-6 mm cyst caudate lobe of the liver, anteroverted uterus, 2.5x2.2 cm left adexal cyst    HC COLP CERVIX/UPPER VAGINA W LOOP ELEC BX CERVIX  1/2005    HC MRI BRAIN W/O CONTRAST  11/2004    paranasal sinus mucosal thickening, normal MRI brain      HYSTERECTOMY TOTAL ABDOMINAL, BILATERAL SALPINGO-OOPHORECTOMY, COMBINED N/A 4/11/2016    Procedure: COMBINED HYSTERECTOMY TOTAL ABDOMINAL, SALPINGO-OOPHORECTOMY;  Surgeon: Isamar Garza MD;  Location:  OR    OVERNIGHT OXIMETRY STUDY  2/2013    event index 1.9/hr, average O2 sat 96%, 16 sec with O2 sat < 88%, stable heart rate    SURGICAL HISTORY OF -   2004    suction curretage    SURGICAL HISTORY OF -   1/2007    Primary repair of multiple small bowel/sigmord colon serosal tears.    [facility] APPENDECTOMY  1993    [facility] EXPLORATORY OF ABDOMEN  1/2006    laparoscopy, mini laparotomy for drainage ovarian cyst, colonic adhesions         SOCIAL HISTORY:  Social History     Socioeconomic History    Marital status:      Spouse name: Mihai    Number of children: 1    Years of education: 14    Highest education level: Not on file   Occupational History    Occupation: quality tech     Employer: [employer]     Comment: [employer]   Tobacco Use    Smoking status: Former     Packs/day: 0.50     Years: 15.00     Pack years: 7.50     Types: Cigarettes     Quit date: 1/1/2013     Years since quitting: 10.7    Smokeless tobacco: Never   Substance and Sexual Activity    Alcohol use: Yes     Comment: 2-4 beers per week    Drug use: No    Sexual activity: Yes     Partners: Male     Comment: same relationship since 1988  "  Other Topics Concern     Service No    Blood Transfusions No    Caffeine Concern Yes    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 Not Asked   Social History Narrative    Lives with  and 6 year daughter.  Has two dogs.     Social Determinants of Health     Financial Resource Strain: Not on file   Food Insecurity: Not on file   Transportation Needs: Not on file   Physical Activity: Not on file   Stress: Not on file   Social Connections: Not on file   Interpersonal Safety: Not on file   Housing Stability: Not on file         FAMILY HISTORY:  Family History   Problem Relation Age of Onset    Kidney Cancer Father 59        renal cell carcinoma    Osteoporosis Father         related to cancer    Connective Tissue Disorder Mother         rheumatoid arthritis    Chronic Obstructive Pulmonary Disease Mother         COPD, smoker    Cardiovascular Mother         carotid endarterectomy,peripheral vascular disease, [place] amputation, smoker    Hypertension Mother     Osteoporosis Mother         prednisone, no fractures    Hypertension Brother     Hypertension Maternal Grandfather     Cervical Cancer Maternal Grandmother 30         at 56    Cerebrovascular Disease Paternal Grandfather     Neurologic Disorder Sister         headaches    Breast Cancer Cousin 51        two maternal cousins, diagnosed at 51 and 52         PHYSICAL EXAM:  Vital signs:  Ht 1.6 m (5' 3\")   Wt 72.6 kg (160 lb)   BMI 28.34 kg/m     ECO     GENERAL/CONSTITUTIONAL: No acute distress. Healthy, alert.  EYES: No scleral icterus.  No redness or discharge.    RESPIRATORY: No audible wheeze, " cough, or visible cyanosis.  No visible retractions or increased work of breathing.  Able to speak fully in complete sentences.  MUSCULOSKELETAL: Normal range of motion.  NEUROLOGIC: Alert, oriented, answers questions appropriately. No tremor. Mentation intact and speech normal  INTEGUMENTARY: No jaundice.  No obvious rash or skin lesions.  PSYCHIATRIC:  Mentation appears normal, affect normal/bright, judgement and insight intact, normal speech and appearance well-groomed.    The rest of a comprehensive physical exam is deferred due to public health emergency video visit restrictions.         IMAGING:  Mammogram     ASSESSMENT/PLAN:  Malu Benavides is a 52year old female with:    1) Right breast [id number] ER/ND-, Her 2 + IDC, s/p lumpectomy, adjuvant TCHP x 5. C6 Taxotere is not delivered due to neuropathy. She has completed 1 year of Herceptin.  She had ER/ND - negative disease, so she would not benefit from oral antihormone therapy.     She did see genetic counseling due to young age at diagnosis - showed a variant of uncertain significance in the BRCA2 gene.  The significance of this is unknown with regards to her breast cancer recurrence risk and ovarian cancer risk.  She did have a baseline transvaginal ultrasound and CA-125 test that were negative.  She has no family history of ovarian cancer, but does have 2 maternal cousins with breast cancer.  She is concerned about ovarian cancer, and has undergone MISSY/BSO.  I discussed the case at our breast cancer tumor conference, and consensus was to undergo surveillance like high-risk breast cancer patient, with MRI breasts and mammograms alternating every 6 months.  She strongly desired to have her ovaries removed, in light of her indeterminate BRCA and history of breast cancer.    Ultimately, she underwent MISSY/BSO on 4/11/16.    Mammogram in  Jan 2023  -next mammogram in January 2024 - ordered  -MRI breast in in August 2024- ordered  -RTC in 1 year for  follow-up  -labs stable today    2) Neuropathy: resolved. She has tapered off the gabapentin.     3) Pulmonary nodule on CT 12/2014. She is a former smoker, quit 2/2015. Repeat CT chest in July 2015 shows no evidence for metastatic disease.  The tiny nodule is thought most likely secondary to prominent vascular structure.    4) She had colonoscopy on 12/17/18, and 2 polyps were removed (sessile serrated adenoma and hyperplastic polyp).    -next colonoscopy was recommended for 5 years from then. Due this year    5) Migraines:  -she follows with her neurologist    6) Moles:   Sees dermatology for follow up      Adrian Chadwick MD  Hematology/Oncology  [facility]      Total time spent on day of visit, including review of tests, obtaining/reviewing separately obtained history, ordering medications/tests/procedures, communicating with PCP/consultants, and documenting in electronic medical record:  30 minutes

## 2023-10-06 NOTE — NURSING NOTE
Is the patient currently in the state of [place]? YES    Visit mode:VIDEO    If the visit is dropped, the patient can be reconnected by: VIDEO VISIT: Text to cell phone:   Telephone Information:   Mobile [phone]       Will anyone else be joining the visit? NO  (If patient encounters technical issues they should call [phone])    How would you like to obtain your AVS? MyChart    Are changes needed to the allergy or medication list? Pt stated no changes to allergies and Pt stated no med changes    Reason for visit: DORITA JACOBO

## 2023-10-06 NOTE — LETTER
10/6/2023         RE: Malu Benavides  [address] [place] [address]        Dear Colleague,    Thank you for referring your patient, Malu Benavides, to the [facility]. Please see a copy of my visit note below.    Virtual Visit Details    Type of service:  Video Visit     Originating Location (pt. Location): Home    Distant Location (provider location):  On-site  Platform used for Video Visit: [facility]    Hematology/Oncology Established Patient Follow-up Note virtual       Today's Date: 10/6/2023    Reason for Follow-up:  right breast [id number] ER-/NV-, Her 2 + IDC, s/p lumpectomy s/p adjuvant TCHP x 5, completed radiation on 7/28/15, completed 1 year of Herceptin.    HISTORY OF PRESENT ILLNESS: Malu Benavides is a 51 year old female who presents for follow-up for her breast cancer.  She was previously a patient of Dr. Long, then Dr. Sterling.  She presented at age 44, her routine mammogram demonstrated an abnormality in the right breast. Biopsy done 12/17/2014 showed grade 3 infiltrating ductal adenocarcinoma. Estrogen and progesterone receptors were negative. HER-2 was positive by FISH with a ratio of 8.5 and staging evaluation for metastatic disease was negative. She experienced a right lumpectomy and sentinel node biopsy on 01/15/2015 demonstrating that the tumor measured 1.2 x 0.8 cm. It was ER/NV negative and HER-2 positive. The solitary sentinel node was negative for metastatic disease. The tumor was therefore staged as pT1c N0 M0, HER-2 positive breast cancer.   She did not have breast complaints on presentation.     Dr. Long recommend TCHP in adjuvant setting with Taxotere, carboplatin, Herceptin and pertuzumab. She had C1-C5 from 01/29/2015 to 4/24/2015. C6 chemo is d/c by Dr. Long due to severe neuropathy. She is advised on continue Herceptin to finish total 1 yr duration of Tx.     She completed radiation  6/5/15-7/28/15.    She completed 1 year of Herceptin in January 2016.    She underwent MISSY/BSO on 4/11/16.      INTERIM HISTORY:  Idania is doing well.  She has no new complaints today. Labs are stable         REVIEW OF SYSTEMS:   14 point ROS was reviewed and is negative other than as noted above in HPI.     HOME MEDICATIONS:  Current Outpatient Medications   Medication Sig Dispense Refill     Acetaminophen (TYLENOL PO) Take 1,000 mg by mouth 2 times daily as needed for mild pain or fever       ACYCLOVIR PO Take 400 mg by mouth 5 times daily As needed for cold sores       FLUOXETINE HCL PO Take by mouth daily       ibuprofen (ADVIL,MOTRIN) 600 MG tablet Take 1 tablet (600 mg) by mouth every 6 hours as needed for moderate pain 120 tablet      nortriptyline (PAMELOR) 10 MG capsule        propranolol (INDERAL) 40 MG tablet Take 40 mg by mouth 2 times daily        SUMAtriptan Succinate (IMITREX PO) Take 100 mg by mouth daily as needed for migraine (100 mg at onset of migraine and MRx1 prn)       Topiramate (TOPAMAX PO) Take 100 mg by mouth At Bedtime           ALLERGIES:  Allergies   Allergen Reactions     No Known Allergies          PAST MEDICAL HISTORY:  Past Medical History:   Diagnosis Date     Adjustment disorder with mixed anxiety and depressed mood 2009    anxiety initially with secondary depressive sx 2013     Breast cancer (H) 1/27/2015     Cervical cancer (H)      Chronic mixed headache syndrome     chronic daily headache and migraine     Dysthymic disorder 2013    Persistent anhedonia and fatigue     Former smoker     quit 2005, 8 pack year history     IBS (irritable bowel syndrome)     diarrhea predominate     Noninfectious ileitis      Papanicolaou smear of cervix with high grade squamous intraepithelial lesion (HGSIL) 1/2005    LEEP     Pregnancy induced hypertension     pregnancy induced hypertension     Recurrent herpes labialis     labialis     S/p small bowel obstruction 1993    small bowel obstruction  following appy, treated nonsurgically     Supervision of other normal pregnancy      - incomplete AB with suction curretage, C section for failure to progress     Tubulovillous adenoma of rectum 2013    2 cm tubullvillous adenoma with no dysplasia         PAST SURGICAL HISTORY:  Past Surgical History:   Procedure Laterality Date     BIOPSY BREAST SEED LOCALIZATION Right 2015    Procedure: BIOPSY BREAST SEED LOCALIZATION;  Surgeon: Brant Townsend MD;  Location: [facility]     BREAST SURGERY       C/SECTION, LOW TRANSVERSE  2007    low segment transverse C section, extensive lysis of adhesions and repair of serosal bowel injuries     COLONOSCOPY  2013    2 mm polyp distal transverse colon(benign mucosa), 20 mm polyp recto-sigmoid colon(tubulovillous adenoma), repeat 3-5 years     CT SCAN ABDOMEN/PELVIS  2010    5-6 mm cyst caudate lobe of the liver, anteroverted uterus, 2.5x2.2 cm left adexal cyst     HC COLP CERVIX/UPPER VAGINA W LOOP ELEC BX CERVIX  2005     HC MRI BRAIN W/O CONTRAST  2004    paranasal sinus mucosal thickening, normal MRI brain       HYSTERECTOMY TOTAL ABDOMINAL, BILATERAL SALPINGO-OOPHORECTOMY, COMBINED N/A 2016    Procedure: COMBINED HYSTERECTOMY TOTAL ABDOMINAL, SALPINGO-OOPHORECTOMY;  Surgeon: Isamar Garza MD;  Location:  OR     OVERNIGHT OXIMETRY STUDY  2013    event index 1.9/hr, average O2 sat 96%, 16 sec with O2 sat < 88%, stable heart rate     SURGICAL HISTORY OF -       suction curretage     SURGICAL HISTORY OF -   2007    Primary repair of multiple small bowel/sigmord colon serosal tears.     [facility] APPENDECTOMY       [facility] EXPLORATORY OF ABDOMEN  2006    laparoscopy, mini laparotomy for drainage ovarian cyst, colonic adhesions         SOCIAL HISTORY:  Social History     Socioeconomic History     Marital status:      Spouse name: Mihai     Number of children: 1     Years of education: 14     Highest education level: Not on  file   Occupational History     Occupation: quality tech     Employer: [employer]     Comment: [employer]   Tobacco Use     Smoking status: Former     Packs/day: 0.50     Years: 15.00     Pack years: 7.50     Types: Cigarettes     Quit date: 1/1/2013     Years since quitting: 10.7     Smokeless tobacco: Never   Substance and Sexual Activity     Alcohol use: Yes     Comment: 2-4 beers per week     Drug use: No     Sexual activity: Yes     Partners: Male     Comment: same relationship since 1988   Other Topics Concern      Service No     Blood Transfusions No     Caffeine Concern Yes     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 Not Asked   Social History Narrative    Lives with  and 6 year daughter.  Has two dogs.     Social Determinants of Health     Financial Resource Strain: Not on file   Food Insecurity: Not on file   Transportation Needs: Not on file   Physical Activity: Not on file   Stress: Not on file   Social Connections: Not on file   Interpersonal Safety: Not on file   Housing Stability: Not on file         FAMILY HISTORY:  Family History   Problem Relation Age of Onset     Kidney Cancer Father 59        renal cell carcinoma     Osteoporosis Father         related to cancer     Connective Tissue Disorder Mother         rheumatoid arthritis     Chronic Obstructive Pulmonary Disease Mother         COPD, smoker     Cardiovascular Mother         carotid endarterectomy,peripheral vascular disease, [place] amputation, smoker     Hypertension Mother      Osteoporosis Mother         prednisone, no fractures     Hypertension Brother   "    Hypertension Maternal Grandfather      Cervical Cancer Maternal Grandmother 30         at 56     Cerebrovascular Disease Paternal Grandfather      Neurologic Disorder Sister         headaches     Breast Cancer Cousin 51        two maternal cousins, diagnosed at 51 and 52         PHYSICAL EXAM:  Vital signs:  Ht 1.6 m (5' 3\")   Wt 72.6 kg (160 lb)   BMI 28.34 kg/m     ECO     GENERAL/CONSTITUTIONAL: No acute distress. Healthy, alert.  EYES: No scleral icterus.  No redness or discharge.    RESPIRATORY: No audible wheeze, cough, or visible cyanosis.  No visible retractions or increased work of breathing.  Able to speak fully in complete sentences.  MUSCULOSKELETAL: Normal range of motion.  NEUROLOGIC: Alert, oriented, answers questions appropriately. No tremor. Mentation intact and speech normal  INTEGUMENTARY: No jaundice.  No obvious rash or skin lesions.  PSYCHIATRIC:  Mentation appears normal, affect normal/bright, judgement and insight intact, normal speech and appearance well-groomed.    The rest of a comprehensive physical exam is deferred due to public health emergency video visit restrictions.         IMAGING:  Mammogram     ASSESSMENT/PLAN:  Malu Benavides is a 52year old female with:    1) Right breast [id number] ER/NM-, Her 2 + IDC, s/p lumpectomy, adjuvant TCHP x 5. C6 Taxotere is not delivered due to neuropathy. She has completed 1 year of Herceptin.  She had ER/NM - negative disease, so she would not benefit from oral antihormone therapy.     She did see genetic counseling due to young age at diagnosis - showed a variant of uncertain significance in the BRCA2 gene.  The significance of this is unknown with regards to her breast cancer recurrence risk and ovarian cancer risk.  She did have a baseline transvaginal ultrasound and CA-125 test that were negative.  She has no family history of ovarian cancer, but does have 2 maternal cousins with breast cancer.  She is concerned about ovarian " cancer, and has undergone MISSY/BSO.  I discussed the case at our breast cancer tumor conference, and consensus was to undergo surveillance like high-risk breast cancer patient, with MRI breasts and mammograms alternating every 6 months.  She strongly desired to have her ovaries removed, in light of her indeterminate BRCA and history of breast cancer.    Ultimately, she underwent MISSY/BSO on 4/11/16.    Mammogram in  Jan 2023  -next mammogram in January 2024 - ordered  -MRI breast in in August 2024- ordered  -RTC in 1 year for follow-up  -labs stable today    2) Neuropathy: resolved. She has tapered off the gabapentin.     3) Pulmonary nodule on CT 12/2014. She is a former smoker, quit 2/2015. Repeat CT chest in July 2015 shows no evidence for metastatic disease.  The tiny nodule is thought most likely secondary to prominent vascular structure.    4) She had colonoscopy on 12/17/18, and 2 polyps were removed (sessile serrated adenoma and hyperplastic polyp).    -next colonoscopy was recommended for 5 years from then. Due this year    5) Migraines:  -she follows with her neurologist    6) Moles:   Sees dermatology for follow up      Adrian Chadwick MD  Hematology/Oncology  [facility]      Total time spent on day of visit, including review of tests, obtaining/reviewing separately obtained history, ordering medications/tests/procedures, communicating with PCP/consultants, and documenting in electronic medical record:  30 minutes      Again, thank you for allowing me to participate in the care of your patient.        Sincerely,        Adrian Chadwick MD

## 2024-02-23 ENCOUNTER — HOSPITAL ENCOUNTER (OUTPATIENT)
Dept: MAMMOGRAPHY | Facility: CLINIC | Age: 54
Discharge: HOME | End: 2024-02-23
Attending: INTERNAL MEDICINE | Admitting: INTERNAL MEDICINE
Payer: COMMERCIAL

## 2024-02-23 DIAGNOSIS — Z12.31 VISIT FOR SCREENING MAMMOGRAM: ICD-10-CM

## 2024-02-23 PROCEDURE — 77063 BREAST TOMOSYNTHESIS BI: CPT

## 2024-10-18 ENCOUNTER — ANCILLARY PROCEDURE (OUTPATIENT)
Dept: MRI IMAGING | Facility: CLINIC | Age: 54
End: 2024-10-18
Attending: INTERNAL MEDICINE
Payer: COMMERCIAL

## 2024-10-18 ENCOUNTER — LAB (OUTPATIENT)
Dept: LAB | Facility: CLINIC | Age: 54
End: 2024-10-18
Payer: COMMERCIAL

## 2024-10-18 DIAGNOSIS — C50.911 MALIGNANT NEOPLASM OF RIGHT BREAST IN FEMALE, ESTROGEN RECEPTOR NEGATIVE, UNSPECIFIED SITE OF BREAST (H): ICD-10-CM

## 2024-10-18 DIAGNOSIS — Z17.1 MALIGNANT NEOPLASM OF RIGHT BREAST IN FEMALE, ESTROGEN RECEPTOR NEGATIVE, UNSPECIFIED SITE OF BREAST (H): ICD-10-CM

## 2024-10-18 LAB
ALBUMIN SERPL BCG-MCNC: 4.4 G/DL (ref 3.5–5.2)
ALP SERPL-CCNC: 134 U/L (ref 40–150)
ALT SERPL W P-5'-P-CCNC: 13 U/L (ref 0–50)
ANION GAP SERPL CALCULATED.3IONS-SCNC: 10 MMOL/L (ref 7–15)
AST SERPL W P-5'-P-CCNC: 18 U/L (ref 0–45)
BASOPHILS # BLD AUTO: 0.1 10E3/UL (ref 0–0.2)
BASOPHILS NFR BLD AUTO: 1 %
BILIRUB SERPL-MCNC: 0.4 MG/DL
BUN SERPL-MCNC: 14.2 MG/DL (ref 6–20)
CALCIUM SERPL-MCNC: 9.7 MG/DL (ref 8.8–10.4)
CANCER AG15-3 SERPL-ACNC: 18 U/ML
CEA SERPL-MCNC: 3.7 NG/ML
CHLORIDE SERPL-SCNC: 108 MMOL/L (ref 98–107)
CREAT SERPL-MCNC: 0.9 MG/DL (ref 0.51–0.95)
EGFRCR SERPLBLD CKD-EPI 2021: 75 ML/MIN/1.73M2
EOSINOPHIL # BLD AUTO: 0.3 10E3/UL (ref 0–0.7)
EOSINOPHIL NFR BLD AUTO: 5 %
ERYTHROCYTE [DISTWIDTH] IN BLOOD BY AUTOMATED COUNT: 13.2 % (ref 10–15)
GLUCOSE SERPL-MCNC: 95 MG/DL (ref 70–99)
HCO3 SERPL-SCNC: 24 MMOL/L (ref 22–29)
HCT VFR BLD AUTO: 40.3 % (ref 35–47)
HGB BLD-MCNC: 12.6 G/DL (ref 11.7–15.7)
IMM GRANULOCYTES # BLD: 0 10E3/UL
IMM GRANULOCYTES NFR BLD: 0 %
LYMPHOCYTES # BLD AUTO: 1.4 10E3/UL (ref 0.8–5.3)
LYMPHOCYTES NFR BLD AUTO: 20 %
MCH RBC QN AUTO: 29.3 PG (ref 26.5–33)
MCHC RBC AUTO-ENTMCNC: 31.3 G/DL (ref 31.5–36.5)
MCV RBC AUTO: 94 FL (ref 78–100)
MONOCYTES # BLD AUTO: 0.4 10E3/UL (ref 0–1.3)
MONOCYTES NFR BLD AUTO: 6 %
NEUTROPHILS # BLD AUTO: 5.1 10E3/UL (ref 1.6–8.3)
NEUTROPHILS NFR BLD AUTO: 69 %
PLATELET # BLD AUTO: 182 10E3/UL (ref 150–450)
POTASSIUM SERPL-SCNC: 4.3 MMOL/L (ref 3.4–5.3)
PROT SERPL-MCNC: 7 G/DL (ref 6.4–8.3)
RBC # BLD AUTO: 4.3 10E6/UL (ref 3.8–5.2)
SODIUM SERPL-SCNC: 142 MMOL/L (ref 135–145)
WBC # BLD AUTO: 7.3 10E3/UL (ref 4–11)

## 2024-10-18 PROCEDURE — 85025 COMPLETE CBC W/AUTO DIFF WBC: CPT

## 2024-10-18 PROCEDURE — 255N000002 HC RX 255 OP 636: Performed by: INTERNAL MEDICINE

## 2024-10-18 PROCEDURE — 86300 IMMUNOASSAY TUMOR CA 15-3: CPT

## 2024-10-18 PROCEDURE — A9585 GADOBUTROL INJECTION: HCPCS | Performed by: INTERNAL MEDICINE

## 2024-10-18 PROCEDURE — 77049 MRI BREAST C-+ W/CAD BI: CPT

## 2024-10-18 PROCEDURE — 36415 COLL VENOUS BLD VENIPUNCTURE: CPT

## 2024-10-18 PROCEDURE — 82378 CARCINOEMBRYONIC ANTIGEN: CPT

## 2024-10-18 PROCEDURE — 80053 COMPREHEN METABOLIC PANEL: CPT

## 2024-10-18 RX ORDER — GADOBUTROL 604.72 MG/ML
7.5 INJECTION INTRAVENOUS ONCE
Status: COMPLETED | OUTPATIENT
Start: 2024-10-18 | End: 2024-10-18

## 2024-10-18 RX ADMIN — GADOBUTROL 7.5 ML: 604.72 INJECTION INTRAVENOUS at 09:53

## 2024-10-25 ENCOUNTER — ONCOLOGY VISIT (OUTPATIENT)
Dept: ONCOLOGY | Facility: CLINIC | Age: 54
End: 2024-10-25
Attending: INTERNAL MEDICINE
Payer: COMMERCIAL

## 2024-10-25 VITALS
DIASTOLIC BLOOD PRESSURE: 66 MMHG | SYSTOLIC BLOOD PRESSURE: 101 MMHG | WEIGHT: 151.2 LBS | RESPIRATION RATE: 16 BRPM | HEART RATE: 63 BPM | BODY MASS INDEX: 26.79 KG/M2 | HEIGHT: 63 IN | OXYGEN SATURATION: 100 %

## 2024-10-25 DIAGNOSIS — Z17.1 MALIGNANT NEOPLASM OF RIGHT BREAST IN FEMALE, ESTROGEN RECEPTOR NEGATIVE, UNSPECIFIED SITE OF BREAST (H): Primary | ICD-10-CM

## 2024-10-25 DIAGNOSIS — C50.911 MALIGNANT NEOPLASM OF RIGHT BREAST IN FEMALE, ESTROGEN RECEPTOR NEGATIVE, UNSPECIFIED SITE OF BREAST (H): Primary | ICD-10-CM

## 2024-10-25 DIAGNOSIS — Z91.89 AT HIGH RISK FOR BREAST CANCER: ICD-10-CM

## 2024-10-25 DIAGNOSIS — R06.83 SNORING: ICD-10-CM

## 2024-10-25 PROCEDURE — G2211 COMPLEX E/M VISIT ADD ON: HCPCS | Performed by: INTERNAL MEDICINE

## 2024-10-25 PROCEDURE — 99214 OFFICE O/P EST MOD 30 MIN: CPT | Performed by: INTERNAL MEDICINE

## 2024-10-25 PROCEDURE — 99213 OFFICE O/P EST LOW 20 MIN: CPT | Performed by: INTERNAL MEDICINE

## 2024-10-25 RX ORDER — HYDROXYZINE HYDROCHLORIDE 25 MG/1
25-50 TABLET, FILM COATED ORAL 3 TIMES DAILY PRN
Qty: 90 TABLET | Refills: 3 | Status: SHIPPED | OUTPATIENT
Start: 2024-10-25

## 2024-10-25 ASSESSMENT — PAIN SCALES - GENERAL: PAINLEVEL_OUTOF10: NO PAIN (0)

## 2024-10-25 NOTE — PROGRESS NOTES
"Oncology Rooming Note    October 25, 2024 1:48 PM   Malu Benavides is a 54 year old female who presents for:    Chief Complaint   Patient presents with    Oncology Clinic Visit     Malignant neoplasm of right breast (H)     Initial Vitals: /66 (BP Location: Left arm, Patient Position: Sitting, Cuff Size: Adult Large)   Pulse 63   Resp 16   Ht 1.6 m (5' 3\")   Wt 68.6 kg (151 lb 3.2 oz)   SpO2 100%   BMI 26.78 kg/m   Estimated body mass index is 26.78 kg/m  as calculated from the following:    Height as of this encounter: 1.6 m (5' 3\").    Weight as of this encounter: 68.6 kg (151 lb 3.2 oz). Body surface area is 1.75 meters squared.  No Pain (0) Comment: Data Unavailable   No LMP recorded. (Menstrual status: UNKNOWN).  Allergies reviewed: Yes  Medications reviewed: Yes    Medications: Medication refills not needed today.  Pharmacy name entered into [facility]:    [id number] [place] - [address] [facility] [place] - ONE [facility] AT PORTAL TO REGISTERED [facility] SITES  [id number] [place] [facility] [place] - [address] [facility] [place] - [address]-    Frailty Screening:   Is the patient here for a new oncology consult visit in cancer care? 2. No      Clinical concerns: None       Amita Duran MA            "

## 2024-10-25 NOTE — PROGRESS NOTES
[facility]    Hematology/Oncology Established Patient Follow-up Note     Today's Date: 10/25/2024    Reason for Follow-up:  right breast [id number] ER-/IA-, Her 2 + IDC, s/p lumpectomy s/p adjuvant TCHP x 5, completed radiation on 7/28/15, completed 1 year of Herceptin.    HISTORY OF PRESENT ILLNESS: Malu Benavides is a 54 year old female who presents for follow-up for her breast cancer.  She was previously a patient of Dr. Long, then Dr. Sterling.  She presented at age 44, her routine mammogram demonstrated an abnormality in the right breast. Biopsy done 12/17/2014 showed grade 3 infiltrating ductal adenocarcinoma. Estrogen and progesterone receptors were negative. HER-2 was positive by FISH with a ratio of 8.5 and staging evaluation for metastatic disease was negative. She experienced a right lumpectomy and sentinel node biopsy on 01/15/2015 demonstrating that the tumor measured 1.2 x 0.8 cm. It was ER/IA negative and HER-2 positive. The solitary sentinel node was negative for metastatic disease. The tumor was therefore staged as pT1c N0 M0, HER-2 positive breast cancer.   She did not have breast complaints on presentation.     Dr. Long recommend TCHP in adjuvant setting with Taxotere, carboplatin, Herceptin and pertuzumab. She had C1-C5 from 01/29/2015 to 4/24/2015. C6 chemo is d/c by Dr. Long due to severe neuropathy. She is advised on continue Herceptin to finish total 1 yr duration of Tx.     She completed radiation 6/5/15-7/28/15.    She completed 1 year of Herceptin in January 2016.    She underwent MISSY/BSO on 4/11/16.      INTERIM HISTORY:  Idania is doing well.  Main concern is she snores at night and she has issues with fatigue.  Not sure if she sleeps well at night.  She is taking Benadryl 4 times a week.  High stress at work.  She has 2 dogs has a 14-year-old she had to name Kaylee and burkett mix named rock who broke her leg and is recovering . Lives with daughter        REVIEW OF  SYSTEMS:   14 point ROS was reviewed and is negative other than as noted above in HPI.     HOME MEDICATIONS:  Current Outpatient Medications   Medication Sig Dispense Refill    Acetaminophen (TYLENOL PO) Take 1,000 mg by mouth 2 times daily as needed for mild pain or fever      ACYCLOVIR PO Take 400 mg by mouth 5 times daily As needed for cold sores      FLUOXETINE HCL PO Take by mouth daily      ibuprofen (ADVIL,MOTRIN) 600 MG tablet Take 1 tablet (600 mg) by mouth every 6 hours as needed for moderate pain 120 tablet     nortriptyline (PAMELOR) 10 MG capsule       propranolol (INDERAL) 40 MG tablet Take 40 mg by mouth 2 times daily       SUMAtriptan Succinate (IMITREX PO) Take 100 mg by mouth daily as needed for migraine (100 mg at onset of migraine and MRx1 prn)      Topiramate (TOPAMAX PO) Take 100 mg by mouth At Bedtime           ALLERGIES:  Allergies   Allergen Reactions    No Known Allergies          PAST MEDICAL HISTORY:  Past Medical History:   Diagnosis Date    Adjustment disorder with mixed anxiety and depressed mood     anxiety initially with secondary depressive sx     Breast cancer (H) 2015    Cervical cancer (H)     Chronic mixed headache syndrome     chronic daily headache and migraine    Dysthymic disorder 2013    Persistent anhedonia and fatigue    Former smoker     quit 2005, 8 pack year history    IBS (irritable bowel syndrome)     diarrhea predominate    Noninfectious ileitis     Papanicolaou smear of cervix with high grade squamous intraepithelial lesion (HGSIL) 2005    LEEP    Pregnancy induced hypertension     pregnancy induced hypertension    Recurrent herpes labialis     labialis    S/p small bowel obstruction     small bowel obstruction following appy, treated nonsurgically    Supervision of other normal pregnancy      - incomplete AB with suction curretage, C section for failure to progress    Tubulovillous adenoma of rectum 2013    2 cm tubullvillous  adenoma with no dysplasia         PAST SURGICAL HISTORY:  Past Surgical History:   Procedure Laterality Date    BIOPSY BREAST SEED LOCALIZATION Right 1/14/2015    Procedure: BIOPSY BREAST SEED LOCALIZATION;  Surgeon: Brant Townsend MD;  Location: [facility]    BREAST SURGERY      C/SECTION, LOW TRANSVERSE  1/2007    low segment transverse C section, extensive lysis of adhesions and repair of serosal bowel injuries    COLONOSCOPY  8/26/2013    2 mm polyp distal transverse colon(benign mucosa), 20 mm polyp recto-sigmoid colon(tubulovillous adenoma), repeat 3-5 years    CT SCAN ABDOMEN/PELVIS  8/2010    5-6 mm cyst caudate lobe of the liver, anteroverted uterus, 2.5x2.2 cm left adexal cyst    HC COLP CERVIX/UPPER VAGINA W LOOP ELEC BX CERVIX  1/2005    HC MRI BRAIN W/O CONTRAST  11/2004    paranasal sinus mucosal thickening, normal MRI brain      HYSTERECTOMY TOTAL ABDOMINAL, BILATERAL SALPINGO-OOPHORECTOMY, COMBINED N/A 4/11/2016    Procedure: COMBINED HYSTERECTOMY TOTAL ABDOMINAL, SALPINGO-OOPHORECTOMY;  Surgeon: Isamar Garza MD;  Location:  OR    OVERNIGHT OXIMETRY STUDY  2/2013    event index 1.9/hr, average O2 sat 96%, 16 sec with O2 sat < 88%, stable heart rate    SURGICAL HISTORY OF -   2004    suction curretage    SURGICAL HISTORY OF -   1/2007    Primary repair of multiple small bowel/sigmord colon serosal tears.    [facility] APPENDECTOMY  1993    [facility] EXPLORATORY OF ABDOMEN  1/2006    laparoscopy, mini laparotomy for drainage ovarian cyst, colonic adhesions         SOCIAL HISTORY:  Social History     Socioeconomic History    Marital status:      Spouse name: Mihai    Number of children: 1    Years of education: 14    Highest education level: Not on file   Occupational History    Occupation: quality tech     Employer: [employer]     Comment: [employer]   Tobacco Use    Smoking status: Former     Current packs/day: 0.00     Average packs/day: 0.5 packs/day for 15.0 years (7.5 ttl pk-yrs)      Types: Cigarettes     Start date: 1998     Quit date: 2013     Years since quittin.8    Smokeless tobacco: Never   Substance and Sexual Activity    Alcohol use: Yes     Comment: 2-4 beers per week    Drug use: No    Sexual activity: Yes     Partners: Male     Comment: same relationship since    Other Topics Concern     Service No    Blood Transfusions No    Caffeine Concern Yes    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 Not Asked   Social History Narrative    Lives with  and 6 year daughter.  Has two dogs.     Social Drivers of Health     Financial Resource Strain: Low Risk  (2024)    [email or web address]    Financial Resource Strain     Difficulty of Paying Living Expenses: 3     Difficulty of Paying Living Expenses: Not on file   Food Insecurity: No Food Insecurity (2024)    [email or web address]    Food Insecurity     Do you worry your food will run out before you are able to buy more?: 1   Transportation Needs: No Transportation Needs (2024)    [email or web address]    Transportation Needs     Lack of Transportation (Medical): 1   Physical Activity: Not on file   Stress: Not on file   Social Connections: Socially Integrated (2024)    [email or web address]    Social Connections     Frequency of Communication with Friends and Family: 0   Interpersonal Safety: Not on file   Housing Stability: Low Risk  (2024)    Received from [facility]    Housing Stability     What is your housing situation today?: 1         FAMILY HISTORY:  Family History   Problem Relation Age of Onset    Kidney Cancer Father 59        renal cell carcinoma    Osteoporosis Father         related to cancer    Connective Tissue Disorder Mother         rheumatoid arthritis    Chronic Obstructive Pulmonary Disease Mother         COPD, smoker    Cardiovascular Mother         carotid endarterectomy,peripheral vascular disease, [place] amputation, smoker    Hypertension Mother     Osteoporosis Mother         prednisone, no fractures    Hypertension Brother     Hypertension Maternal Grandfather     Cervical Cancer Maternal Grandmother 30         at 56    Cerebrovascular Disease Paternal Grandfather     Neurologic Disorder Sister         headaches    Breast Cancer Cousin 51        two maternal cousins, diagnosed at 51 and 52         PHYSICAL EXAM:  Vital signs:  /66 (BP Location: Left arm, Patient Position: Sitting, Cuff Size: Adult Large)   Pulse 63   Resp 16   Ht 1.6 m (5' 3\")   Wt 68.6 kg (151 lb 3.2 oz)   SpO2 100%   BMI 26.78 kg/m     ECO     Breast exam        IMAGING:  Mammogram  2024 normal  MRI breast 10/2024 normal     ASSESSMENT/PLAN:  Malu Benavides is a 54 year old female with:    1) Right breast [id number] ER/NC-, Her 2 + IDC, s/p lumpectomy, adjuvant TCHP x 5. C6 Taxotere is not delivered due to neuropathy. She has completed 1 year of Herceptin.  She had ER/NC - negative disease, so she would not benefit from oral antihormone therapy.     She did see genetic counseling due to young age at diagnosis - showed a variant of uncertain significance in the BRCA2 gene.  The significance of this is unknown with regards to her breast cancer recurrence risk and ovarian cancer risk.  She did have a baseline transvaginal ultrasound and CA-125 test that were negative.  She has no family history of ovarian cancer, but does have 2 maternal cousins with breast " cancer.  She is concerned about ovarian cancer, and has undergone MISSY/BSO.  I discussed the case at our breast cancer tumor conference, and consensus was to undergo surveillance like high-risk breast cancer patient, with MRI breasts and mammograms alternating every 6 months.  She strongly desired to have her ovaries removed, in light of her indeterminate BRCA and history of breast cancer.    Ultimately, she underwent MISSY/BSO on 4/11/16.    Mammogram in  Jan 2023  -next mammogram in  feb 2025  -MRI breast in in October 2025- ordered  -RTC in 1 year for follow-up  -labs stable today    2) Neuropathy: resolved. She has tapered off the gabapentin.     3) Pulmonary nodule on CT 12/2014. She is a former smoker, quit 2/2015. Repeat CT chest in July 2015 shows no evidence for metastatic disease.  The tiny nodule is thought most likely secondary to prominent vascular structure.    4) up to date on colonosocpy  5) Migraines:  -she follows with her neurologist    6) Moles:   Sees dermatology for follow up    7) sleep study and hydroxyzine prn for sleep . Referral sent      Adrian Chadwick MD  Hematology/Oncology  [facility]      Total time spent on day of visit, including review of tests, obtaining/reviewing separately obtained history, ordering medications/tests/procedures, communicating with PCP/consultants, and documenting in electronic medical record:  30 minutes

## 2024-10-25 NOTE — LETTER
"10/25/2024      Malu Benavides  [address] [place] [address]      Dear Colleague,    Thank you for referring your patient, Malu Benavides, to the [facility]. Please see a copy of my visit note below.    Oncology Rooming Note    October 25, 2024 1:48 PM   Malu Benavides is a 54 year old female who presents for:    Chief Complaint   Patient presents with     Oncology Clinic Visit     Malignant neoplasm of right breast (H)     Initial Vitals: /66 (BP Location: Left arm, Patient Position: Sitting, Cuff Size: Adult Large)   Pulse 63   Resp 16   Ht 1.6 m (5' 3\")   Wt 68.6 kg (151 lb 3.2 oz)   SpO2 100%   BMI 26.78 kg/m   Estimated body mass index is 26.78 kg/m  as calculated from the following:    Height as of this encounter: 1.6 m (5' 3\").    Weight as of this encounter: 68.6 kg (151 lb 3.2 oz). Body surface area is 1.75 meters squared.  No Pain (0) Comment: Data Unavailable   No LMP recorded. (Menstrual status: UNKNOWN).  Allergies reviewed: Yes  Medications reviewed: Yes    Medications: Medication refills not needed today.  Pharmacy name entered into [facility]:    [facility] [id number] [address] [facility] [place] - [address] [facility] - JOSELYN KATHLEEN - ONE [facility] AT PORTAL TO REGISTERED [facility] SITES  [facility] [address] [address] [place], [place] - [address] [facility] [address] [place] [address]-    Frailty Screening:   Is the patient here for a new oncology consult visit in cancer care? 2. No      Clinical concerns: None       Amita Duran MA              [facility]    Hematology/Oncology Established Patient Follow-up Note     Today's Date: 10/25/2024    Reason for Follow-up:  right breast [id number] ER-/GA-, Her 2 + IDC, s/p lumpectomy s/p adjuvant TCHP x 5, completed radiation on 7/28/15, completed 1 year of Herceptin.    HISTORY OF PRESENT ILLNESS: Malu Benavides is a 54 year old " female who presents for follow-up for her breast cancer.  She was previously a patient of Dr. Long, then Dr. Sterling.  She presented at age 44, her routine mammogram demonstrated an abnormality in the right breast. Biopsy done 12/17/2014 showed grade 3 infiltrating ductal adenocarcinoma. Estrogen and progesterone receptors were negative. HER-2 was positive by FISH with a ratio of 8.5 and staging evaluation for metastatic disease was negative. She experienced a right lumpectomy and sentinel node biopsy on 01/15/2015 demonstrating that the tumor measured 1.2 x 0.8 cm. It was ER/CO negative and HER-2 positive. The solitary sentinel node was negative for metastatic disease. The tumor was therefore staged as pT1c N0 M0, HER-2 positive breast cancer.   She did not have breast complaints on presentation.     Dr. Long recommend TCHP in adjuvant setting with Taxotere, carboplatin, Herceptin and pertuzumab. She had C1-C5 from 01/29/2015 to 4/24/2015. C6 chemo is d/c by Dr. Long due to severe neuropathy. She is advised on continue Herceptin to finish total 1 yr duration of Tx.     She completed radiation 6/5/15-7/28/15.    She completed 1 year of Herceptin in January 2016.    She underwent MISSY/BSO on 4/11/16.      INTERIM HISTORY:  Idania is doing well.  Main concern is she snores at night and she has issues with fatigue.  Not sure if she sleeps well at night.  She is taking Benadryl 4 times a week.  High stress at work.  She has 2 dogs has a 14-year-old she had to name Kaylee and burkett mix named rock who broke her leg and is recovering . Lives with daughter        REVIEW OF SYSTEMS:   14 point ROS was reviewed and is negative other than as noted above in HPI.     HOME MEDICATIONS:  Current Outpatient Medications   Medication Sig Dispense Refill     Acetaminophen (TYLENOL PO) Take 1,000 mg by mouth 2 times daily as needed for mild pain or fever       ACYCLOVIR PO Take 400 mg by mouth 5 times daily As needed for cold sores        FLUOXETINE HCL PO Take by mouth daily       ibuprofen (ADVIL,MOTRIN) 600 MG tablet Take 1 tablet (600 mg) by mouth every 6 hours as needed for moderate pain 120 tablet      nortriptyline (PAMELOR) 10 MG capsule        propranolol (INDERAL) 40 MG tablet Take 40 mg by mouth 2 times daily        SUMAtriptan Succinate (IMITREX PO) Take 100 mg by mouth daily as needed for migraine (100 mg at onset of migraine and MRx1 prn)       Topiramate (TOPAMAX PO) Take 100 mg by mouth At Bedtime           ALLERGIES:  Allergies   Allergen Reactions     No Known Allergies          PAST MEDICAL HISTORY:  Past Medical History:   Diagnosis Date     Adjustment disorder with mixed anxiety and depressed mood     anxiety initially with secondary depressive sx      Breast cancer (H) 2015     Cervical cancer (H)      Chronic mixed headache syndrome     chronic daily headache and migraine     Dysthymic disorder 2013    Persistent anhedonia and fatigue     Former smoker     quit , 8 pack year history     IBS (irritable bowel syndrome)     diarrhea predominate     Noninfectious ileitis      Papanicolaou smear of cervix with high grade squamous intraepithelial lesion (HGSIL) 2005    LEEP     Pregnancy induced hypertension     pregnancy induced hypertension     Recurrent herpes labialis     labialis     S/p small bowel obstruction     small bowel obstruction following appy, treated nonsurgically     Supervision of other normal pregnancy      - incomplete AB with suction curretage, C section for failure to progress     Tubulovillous adenoma of rectum 2013    2 cm tubullvillous adenoma with no dysplasia         PAST SURGICAL HISTORY:  Past Surgical History:   Procedure Laterality Date     BIOPSY BREAST SEED LOCALIZATION Right 2015    Procedure: BIOPSY BREAST SEED LOCALIZATION;  Surgeon: Brant Townsend MD;  Location: [facility]     BREAST SURGERY       C/SECTION, LOW TRANSVERSE  2007    low segment  transverse C section, extensive lysis of adhesions and repair of serosal bowel injuries     COLONOSCOPY  2013    2 mm polyp distal transverse colon(benign mucosa), 20 mm polyp recto-sigmoid colon(tubulovillous adenoma), repeat 3-5 years     CT SCAN ABDOMEN/PELVIS  2010    5-6 mm cyst caudate lobe of the liver, anteroverted uterus, 2.5x2.2 cm left adexal cyst     HC COLP CERVIX/UPPER VAGINA W LOOP ELEC BX CERVIX  2005     HC MRI BRAIN W/O CONTRAST  2004    paranasal sinus mucosal thickening, normal MRI brain       HYSTERECTOMY TOTAL ABDOMINAL, BILATERAL SALPINGO-OOPHORECTOMY, COMBINED N/A 2016    Procedure: COMBINED HYSTERECTOMY TOTAL ABDOMINAL, SALPINGO-OOPHORECTOMY;  Surgeon: Isamar Garza MD;  Location:  OR     OVERNIGHT OXIMETRY STUDY  2013    event index 1.9/hr, average O2 sat 96%, 16 sec with O2 sat < 88%, stable heart rate     SURGICAL HISTORY OF -       suction curretage     SURGICAL HISTORY OF -   2007    Primary repair of multiple small bowel/sigmord colon serosal tears.     RUST APPENDECTOMY       RUST EXPLORATORY OF ABDOMEN  2006    laparoscopy, mini laparotomy for drainage ovarian cyst, colonic adhesions         SOCIAL HISTORY:  Social History     Socioeconomic History     Marital status:      Spouse name: Mihai     Number of children: 1     Years of education: 14     Highest education level: Not on file   Occupational History     Occupation: quality tech     Employer: [employer]     Comment: [employer]   Tobacco Use     Smoking status: Former     Current packs/day: 0.00     Average packs/day: 0.5 packs/day for 15.0 years (7.5 ttl pk-yrs)     Types: Cigarettes     Start date: 1998     Quit date: 2013     Years since quittin.8     Smokeless tobacco: Never   Substance and Sexual Activity     Alcohol use: Yes     Comment: 2-4 beers per week     Drug use: No     Sexual activity: Yes     Partners: Male     Comment: same relationship since  1988   Other Topics Concern      Service No     Blood Transfusions No     Caffeine Concern Yes     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 Not Asked   Social History Narrative    Lives with  and 6 year daughter.  Has two dogs.     Social Drivers of Health     Financial Resource Strain: Low Risk  (6/17/2024)    Received from [employer]    Financial Resource Strain      Difficulty of Paying Living Expenses: 3      Difficulty of Paying Living Expenses: Not on file   Food Insecurity: No Food Insecurity (6/17/2024)    Received from [employer]    Food Insecurity      Do you worry your food will run out before you are able to buy more?: 1   Transportation Needs: No Transportation Needs (6/17/2024)    Received from [employer]    Transportation Needs      Lack of Transportation (Medical): 1   Physical Activity: Not on file   Stress: Not on file   Social Connections: Socially Integrated (6/17/2024)    Received from [employer]    Social Connections      Frequency of Communication with Friends and Family: 0   Interpersonal Safety: Not on file   Housing Stability: Low Risk  (6/17/2024)    Received from [employer]    Housing Stability      What is your housing situation today?: 1         FAMILY HISTORY:  Family History   Problem Relation Age of Onset     Kidney Cancer Father 59        renal cell carcinoma     Osteoporosis Father         related to cancer     Connective Tissue Disorder  "Mother         rheumatoid arthritis     Chronic Obstructive Pulmonary Disease Mother         COPD, smoker     Cardiovascular Mother         carotid endarterectomy,peripheral vascular disease, [place] amputation, smoker     Hypertension Mother      Osteoporosis Mother         prednisone, no fractures     Hypertension Brother      Hypertension Maternal Grandfather      Cervical Cancer Maternal Grandmother 30         at 56     Cerebrovascular Disease Paternal Grandfather      Neurologic Disorder Sister         headaches     Breast Cancer Cousin 51        two maternal cousins, diagnosed at 51 and 52         PHYSICAL EXAM:  Vital signs:  /66 (BP Location: Left arm, Patient Position: Sitting, Cuff Size: Adult Large)   Pulse 63   Resp 16   Ht 1.6 m (5' 3\")   Wt 68.6 kg (151 lb 3.2 oz)   SpO2 100%   BMI 26.78 kg/m     ECO     Breast exam        IMAGING:  Mammogram  2024 normal  MRI breast 10/2024 normal     ASSESSMENT/PLAN:  Malu Benavides is a 54 year old female with:    1) Right breast [id number] ER/WY-, Her 2 + IDC, s/p lumpectomy, adjuvant TCHP x 5. C6 Taxotere is not delivered due to neuropathy. She has completed 1 year of Herceptin.  She had ER/WY - negative disease, so she would not benefit from oral antihormone therapy.     She did see genetic counseling due to young age at diagnosis - showed a variant of uncertain significance in the BRCA2 gene.  The significance of this is unknown with regards to her breast cancer recurrence risk and ovarian cancer risk.  She did have a baseline transvaginal ultrasound and CA-125 test that were negative.  She has no family history of ovarian cancer, but does have 2 maternal cousins with breast cancer.  She is concerned about ovarian cancer, and has undergone MISSY/BSO.  I discussed the case at our breast cancer tumor conference, and consensus was to undergo surveillance like high-risk breast cancer patient, with MRI breasts and mammograms alternating every 6 months. "  She strongly desired to have her ovaries removed, in light of her indeterminate BRCA and history of breast cancer.    Ultimately, she underwent MISSY/BSO on 4/11/16.    Mammogram in  Jan 2023  -next mammogram in  feb 2025  -MRI breast in in October 2025- ordered  -RTC in 1 year for follow-up  -labs stable today    2) Neuropathy: resolved. She has tapered off the gabapentin.     3) Pulmonary nodule on CT 12/2014. She is a former smoker, quit 2/2015. Repeat CT chest in July 2015 shows no evidence for metastatic disease.  The tiny nodule is thought most likely secondary to prominent vascular structure.    4) up to date on colonosocpy  5) Migraines:  -she follows with her neurologist    6) Moles:   Sees dermatology for follow up    7) sleep study and hydroxyzine prn for sleep . Referral sent      Adrian Chadwick MD  Hematology/Oncology  [facility]      Total time spent on day of visit, including review of tests, obtaining/reviewing separately obtained history, ordering medications/tests/procedures, communicating with PCP/consultants, and documenting in electronic medical record:  30 minutes      Again, thank you for allowing me to participate in the care of your patient.        Sincerely,        Adrian Chadwick MD

## 2025-02-28 ENCOUNTER — HOSPITAL ENCOUNTER (OUTPATIENT)
Dept: MAMMOGRAPHY | Facility: CLINIC | Age: 55
Discharge: HOME | End: 2025-02-28
Attending: INTERNAL MEDICINE | Admitting: INTERNAL MEDICINE
Payer: COMMERCIAL

## 2025-02-28 DIAGNOSIS — Z17.1 MALIGNANT NEOPLASM OF RIGHT BREAST IN FEMALE, ESTROGEN RECEPTOR NEGATIVE, UNSPECIFIED SITE OF BREAST (H): ICD-10-CM

## 2025-02-28 DIAGNOSIS — R06.83 SNORING: ICD-10-CM

## 2025-02-28 DIAGNOSIS — Z91.89 AT HIGH RISK FOR BREAST CANCER: ICD-10-CM

## 2025-02-28 DIAGNOSIS — C50.911 MALIGNANT NEOPLASM OF RIGHT BREAST IN FEMALE, ESTROGEN RECEPTOR NEGATIVE, UNSPECIFIED SITE OF BREAST (H): ICD-10-CM

## 2025-02-28 PROCEDURE — 77063 BREAST TOMOSYNTHESIS BI: CPT

## 2025-02-28 PROCEDURE — 77067 SCR MAMMO BI INCL CAD: CPT

## 2025-03-11 NOTE — PROGRESS NOTES
Outpatient Sleep Medicine Consultation:      Name: Malu Benavides MRN# [id number]   Age: 54 year old YOB: 1970     Date of Consultation: 2025  Consultation is requested by: Adrian Chadwick MD  [address] [place] [id number] Adrian Chadwick  Primary care provider: No Ref-Primary, Physician       Reason for Sleep Consult:     Malu Benavides is sent by Adrian Chadwick for a sleep consultation regarding snoring.    Patient s Reason for visit  Mlau Benavides main reason for visit: (Patient-Rptd) snoring very loud and i dont feel like i get a good night sleep  Patient states problem(s) started: (Patient-Rptd) years  Malu Benavides's goals for this visit: (Patient-Rptd) do i have sleep apnea           Assessment and Plan:     Summary Sleep Diagnoses and Recommendations:  (G47.26) Shift work sleep disorder  (primary encounter diagnosis)  Comment: Idania wakes for work at 4:30 AM. She gets into bed at 6 PM. On weekends, she is in bed 10 PM to 8 AM. She has frequent awakenings and feels her sleep is light. She especially noticed difficulty getting to sleep on Sundays. She naps on weekends.  Plan: We discussed getting 30 minutes of bright light exposure in the morning, either with the sun or a SAD Lamp of 10,000 lux intensity. Avoid bright light, including electronics, in the hour before bedtime. Take 1 mg melatonin 3-5 hours prior to natural sleep onset. Keep a consistent sleep schedule, avoiding naps and limit sleeping in to about an hour.      (R06.83) Snoring  Comment: Idania has been told by a number of people that she snores very loudly. Her   2.5 years ago, so she has not been regularly observed since then. No one has told her she stops breathing in her sleep. She had a home sleep study in 2017 that showed borderline apnea, AHI 5.6/hr. The notes on the interpretation suggested she may have had RERAs that could not be scored. She has lost 5# since that  test. She does not know if her snoring is better or worse than in 2017. She is more tired. Her ESS is 11/24.  She naps on weekends. Her age is >50 (54). Negative risk factors: no HTN, BMI is 26, neck is 39 cm.   Plan: We discussed that home sleep studies can underestimate and are unable to calculate RERAs, so she may have more sleep disruption from apnea than that study demonstrated. I suggested an in lab study to give us a more sensitive look. Alternatively, treatment could be initiated. She wished to try working on the above recommendations first. I recommended using the [employer] khalif to gather more information about her snoring/breathing at night.        Comorbid Diagnoses:  Patient Active Problem List   Diagnosis    Chronic mixed headache syndrome    IBS (irritable bowel syndrome)    Former smoker    Dysthymic disorder    Pulmonary nodules    Neuropathy    Malignant neoplasm of right breast (H)    S/P hysterectomy        Summary Counseling:    Sleep Testing Reviewed  Obstructive Sleep Apnea Reviewed  Complications of Untreated Sleep Apnea Reviewed      Patient will follow up in 6 months.   Bennett Goltz, PA-C      Total time spent reviewing medical records, history and physical examination, review of previous testing and interpretation as well as documentation on this date:53 min    CC: Adrian Chadwick          History of Present Illness:     Malu Benavides presents with concerns of never feeling rested. Her  said she snored really loudly. He passed 2.5 years ago. Her cousin also mentioned that more recently. She wakes 3-4 times per night either for pets or for the restroom.   She spends a lot of time in bed. She worries that if she does not get a good night of sleep, she gets a migraine. She hates her job.   She sleeps fine on weekends, but also naps or goes back to bed in the morning.   She had borderline apnea diagnosed via HST in 2017. Treatment was not initiated. She is not sure if her snoring  is worse now than in 2017. She is more tired.     Past Sleep Evaluations: HST [facility] [place] 4/17/2017 AHI 5.6/hr. Supine AHI 6/hr. O2 cristian 87%. Wt: 152#.     SLEEP-WAKE SCHEDULE:     Work/School Days: Patient goes to school/work: (Patient-Rptd) Yes   Usually gets into bed at (Patient-Rptd) 7pm Lays down at 6 PM because she is tired.    Takes patient about (Patient-Rptd) 15 to  30minutes to fall asleep  Has trouble falling asleep (Patient-Rptd) l to 2 nights per week  Wakes up in the middle of the night (Patient-Rptd) 3to 4 times.  Wakes up due to (Patient-Rptd) External stimuli (bed partner, pets, noise, etc);Use the bathroom. RRx3.   She has trouble falling back asleep (Patient-Rptd) 0 times a week.   It usually takes   to get back to sleep  Patient is usually up at (Patient-Rptd) 430am  Uses alarm: (Patient-Rptd) Yes    Weekends/Non-work Days/All Other Days:  Usually gets into bed at (Patient-Rptd) 10pm   Takes patient about (Patient-Rptd) 10 to fall asleep  Patient is usually up at (Patient-Rptd) 8am  Uses alarm: (Patient-Rptd) No    Sleep Need  Patient gets  (Patient-Rptd) 8 sleep on average   Patient thinks she needs about (Patient-Rptd) 10 sleep    Malu Benavides prefers to sleep in this position(s): (Patient-Rptd) Side   Patient states they do the following activities in bed: (Patient-Rptd) Watch TV    Naps  Patient takes a purposeful nap (Patient-Rptd) weekends times a week and naps are usually (Patient-Rptd) 2hours in duration. She falls asleep quickly and sleeps soundly.   She feels better after a nap: (Patient-Rptd) Yes  She dozes off unintentionally (Patient-Rptd) no days per week  Patient has had a driving accident or near-miss due to sleepiness/drowsiness: (Patient-Rptd) No      SLEEP DISRUPTIONS:    Breathing/Snoring  Patient snores:(Patient-Rptd) Yes  Other people complain about her snoring: (Patient-Rptd) Yes  Patient has been told she stops breathing in her sleep:(Patient-Rptd) No. She denies  waking from her own snoring or waking with a snort.  She has issues with the following: (Patient-Rptd) Morning headaches;Morning mouth dryness;Stuffy nose when you wake up;Getting up to urinate more than once. Morning headaches: maybe 1-2 times per month, has had migraines for 40 years (triggered by certain foods, stress, alcohol, lack of sleep, can occur any time of day). Nocturnal heartburn or reflux: no. Nasal congestion at night: no. She does wake with a very dry mouth.    Movement:  Patient gets pain, discomfort, with an urge to move:  (Patient-Rptd) No restless legs symptoms  It happens when she is resting:     It happens more at night:     Patient has been told she kicks her legs at night:    not observed     Behaviors in Sleep:  Malu Benavides has experienced the following behaviors while sleeping:   she used to wear a bite guard, but is not sure if she grinds now. Her dentist has not complained and she denies sore jaw. Has been observed to talk in her sleep. She denies nightmares.   Pt denies bruxism, sleep walking, and dream enactment behavior. Pt denies sleep paralysis, hypnagogue and cataplexy.       Is there anything else you would like your sleep provider to know:        CAFFEINE AND OTHER SUBSTANCES:    Patient consumes caffeinated beverages per day:  (Patient-Rptd) 1  Last caffeine use is usually: (Patient-Rptd) 530am  List of any prescribed or over the counter stimulants that patient takes: (Patient-Rptd) red bull  List of any prescribed or over the counter sleep medication patient takes: (Patient-Rptd) Benedryl 50 mg Sun-Weds, since  passed. Nortriptyline 10 mg for headaches for 15-20 years.   List of previous sleep medications that patient has tried:   hydroxyzine 25 mg (tried it once, did not help)  Patient drinks alcohol to help them sleep: (Patient-Rptd) No  Patient drinks alcohol near bedtime: (Patient-Rptd) No    Family History:  Patient has a family member been diagnosed with a  sleep disorder: (Patient-Rptd) No    Mother fell asleep a lot in the day    Social History:  Her  passed 2.5 years ago. She lives with her 18 year old daughter.   She works in  for an immunoassay equipment .     SCALES:    EPWORTH SLEEPINESS SCALE         3/12/2025    10:58 AM    Bethany Sleepiness Scale ( LEXX Agrawal  [id number]<br>ESS - [place]/English - Final version - 21 Nov 07 - [facility] [employer].)   Sitting and reading Slight chance of dozing   Watching TV High chance of dozing   Sitting, inactive in a public place (e.g. a theatre or a meeting) Would never doze   As a passenger in a car for an hour without a break Moderate chance of dozing   Lying down to rest in the afternoon when circumstances permit High chance of dozing   Sitting and talking to someone Would never doze   Sitting quietly after a lunch without alcohol Moderate chance of dozing   In a car, while stopped for a few minutes in traffic Would never doze   Bethany Score (MC) 11   Bethany Score (Sleep) 11        Patient-reported         INSOMNIA SEVERITY INDEX (EDUARDO)          3/12/2025    10:46 AM   Insomnia Severity Index (EDUARDO)   Difficulty falling asleep 1   Difficulty staying asleep 1   Problems waking up too early 0   How SATISFIED/DISSATISFIED are you with your CURRENT sleep pattern? 2   How NOTICEABLE to others do you think your sleep problem is in terms of impairing the quality of your life? 1   How WORRIED/DISTRESSED are you about your current sleep problem? 2   To what extent do you consider your sleep problem to INTERFERE with your daily functioning (e.g. daytime fatigue, mood, ability to function at work/daily chores, concentration, memory, mood, etc.) CURRENTLY? 2   EDUARDO Total Score 9        Patient-reported       Guidelines for Scoring/Interpretation:  Total score categories:  0-7 = No clinically significant insomnia   8-14 = Subthreshold insomnia   15-21 = Clinical insomnia (moderate severity)  22-28 =  "Clinical insomnia (severe)  Used via courtesy of www.myhealth.va.gov with permission from Roger Huggins PhD., [facility]      STOP BANG         3/12/2025    11:00 AM   STOP BANG Questionnaire (  2008, the American Society of Anesthesiologists, Inc. [employer].)   Neck Cir (cm) Clinic: 39 cm   B/P Clinic: 107/70   BMI Clinic: 26.04         GAD7         No data to display                  CAGE-AID         No data to display                CAGE-AID reprinted with permission from the [place] Medical Journal, HAKEEM Solano. and MORENA Ramírez, \"Conjoint screening questionnaires for alcohol and drug abuse\" [place] Medical Journal 94: 135-140, 1995.      PATIENT HEALTH QUESTIONNAIRE-9 (PHQ - 9)        3/12/2025    10:45 AM   PHQ-9 ([employer])   1.  Little interest or pleasure in doing things 1   2.  Feeling down, depressed, or hopeless 1   3.  Trouble falling or staying asleep, or sleeping too much 0   4.  Feeling tired or having little energy 3   5.  Poor appetite or overeating 0   6.  Feeling bad about yourself - or that you are a failure or have let yourself or your family down 0   7.  Trouble concentrating on things, such as reading the newspaper or watching television 0   8.  Moving or speaking so slowly that other people could have noticed. Or the opposite - being so fidgety or restless that you have been moving around a lot more than usual 0   9.  Thoughts that you would be better off dead, or of hurting yourself in some way 0   PHQ-9 Total Score 5    6.  Feeling bad about yourself 0   7.  Trouble concentrating 0   8.  Moving slowly or restless 0   9.  Suicidal or self-harm thoughts 0   1.  Little interest or pleasure in doing things Several days   2.  Feeling down, depressed, or hopeless Several days   3.  Trouble falling or staying asleep, or sleeping too much Not at all   4.  Feeling tired or having little energy Nearly every day   5.  Poor appetite or overeating Not at all   6.  " Feeling bad about yourself Not at all   7.  Trouble concentrating Not at all   8.  Moving slowly or restless Not at all   9.  Suicidal or self-harm thoughts Not at all   PHQ-9 via [facility] TOTAL SCORE-----> 5 (Mild depression)   Difficulty at work, home, or with people Not difficult at all       Patient-reported       Developed by Dino Rubalcava, Whit Ramirez, Salazar Hinkle and colleagues, with an educational isha from [employer]. No permission required to reproduce, translate, display or distribute.        Allergies:    Allergies   Allergen Reactions    No Known Allergies        Medications:    Current Outpatient Medications   Medication Sig Dispense Refill    Acetaminophen (TYLENOL PO) Take 1,000 mg by mouth 2 times daily as needed for mild pain or fever      AJOVY 225 MG/1.5ML SOAJ INJECT THE CONTENTS OF ONE AUTOINJECTOR SUBCUTANEOUSLY ONCE PER MONTH OR CAN TAKE 3 INJECTIONS EVERY 3 MONTHS.      FLUOXETINE HCL PO Take by mouth daily      ibuprofen (ADVIL,MOTRIN) 600 MG tablet Take 1 tablet (600 mg) by mouth every 6 hours as needed for moderate pain 120 tablet     nortriptyline (PAMELOR) 10 MG capsule       propranolol (INDERAL) 40 MG tablet Take 40 mg by mouth 2 times daily       SUMAtriptan Succinate (IMITREX PO) Take 100 mg by mouth daily as needed for migraine (100 mg at onset of migraine and MRx1 prn)      Topiramate (TOPAMAX PO) Take 100 mg by mouth At Bedtime      atorvastatin (LIPITOR) 20 MG tablet Take 20 mg by mouth at bedtime.         Problem List:  Patient Active Problem List    Diagnosis Date Noted    Visit for screening mammogram 10/07/2016     Priority: Medium    S/P hysterectomy 04/11/2016     Priority: Medium    Malignant neoplasm of right breast (H) 01/25/2016     Priority: Medium    Pulmonary nodules 05/20/2015     Priority: Medium    Neuropathy 05/20/2015     Priority: Medium    Diarrhea 05/20/2015     Priority: Medium    Recurrent herpes labialis      Priority: Medium      labialis      Tubulovillous adenoma of rectum 2013     Priority: Medium     2 cm tubullvillous adenoma with no dysplasia      Dysthymic disorder      Priority: Medium     Persistent anhedonia and fatigue      CARDIOVASCULAR SCREENING; LDL GOAL LESS THAN 160 10/31/2010     Priority: Medium    Chronic mixed headache syndrome      Priority: Medium     chronic daily headache with intermittent migraine      IBS (irritable bowel syndrome)      Priority: Medium     diarrhea predominate      Former smoker      Priority: Medium     quit , 8 pack year history      Papanicolaou smear of cervix with high grade squamous intraepithelial lesion (HGSIL) 2005     Priority: Medium     LEEP          Past Medical/Surgical History:  Past Medical History:   Diagnosis Date    Adjustment disorder with mixed anxiety and depressed mood     anxiety initially with secondary depressive sx     Breast cancer (H) 2015    Cervical cancer (H)     Chronic mixed headache syndrome     chronic daily headache and migraine    Dysthymic disorder 2013    Persistent anhedonia and fatigue    Former smoker     quit 2005, 8 pack year history    IBS (irritable bowel syndrome)     diarrhea predominate    Noninfectious ileitis     Papanicolaou smear of cervix with high grade squamous intraepithelial lesion (HGSIL) 2005    LEEP    Pregnancy induced hypertension     pregnancy induced hypertension    Recurrent herpes labialis     labialis    S/p small bowel obstruction     small bowel obstruction following appy, treated nonsurgically    Supervision of other normal pregnancy      - incomplete AB with suction curretage, C section for failure to progress    Tubulovillous adenoma of rectum 2013    2 cm tubullvillous adenoma with no dysplasia     Past Surgical History:   Procedure Laterality Date    BIOPSY BREAST SEED LOCALIZATION Right 2015    Procedure: BIOPSY BREAST SEED LOCALIZATION;  Surgeon: Brant Townsend,  MD;  Location: [facility]    BREAST SURGERY      C/SECTION, LOW TRANSVERSE  2007    low segment transverse C section, extensive lysis of adhesions and repair of serosal bowel injuries    COLONOSCOPY  2013    2 mm polyp distal transverse colon(benign mucosa), 20 mm polyp recto-sigmoid colon(tubulovillous adenoma), repeat 3-5 years    CT SCAN ABDOMEN/PELVIS  2010    5-6 mm cyst caudate lobe of the liver, anteroverted uterus, 2.5x2.2 cm left adexal cyst    HC COLP CERVIX/UPPER VAGINA W LOOP ELEC BX CERVIX  2005    HC MRI BRAIN W/O CONTRAST  2004    paranasal sinus mucosal thickening, normal MRI brain      HYSTERECTOMY TOTAL ABDOMINAL, BILATERAL SALPINGO-OOPHORECTOMY, COMBINED N/A 2016    Procedure: COMBINED HYSTERECTOMY TOTAL ABDOMINAL, SALPINGO-OOPHORECTOMY;  Surgeon: Isamar Garza MD;  Location:  OR    OVERNIGHT OXIMETRY STUDY  2013    event index 1.9/hr, average O2 sat 96%, 16 sec with O2 sat < 88%, stable heart rate    SURGICAL HISTORY OF -       suction curretage    SURGICAL HISTORY OF -   2007    Primary repair of multiple small bowel/sigmord colon serosal tears.    [facility] APPENDECTOMY      [facility] EXPLORATORY OF ABDOMEN  2006    laparoscopy, mini laparotomy for drainage ovarian cyst, colonic adhesions       Social History:  Social History     Socioeconomic History    Marital status:      Spouse name: Mihai    Number of children: 1    Years of education: 14    Highest education level: Not on file   Occupational History    Occupation: quality tech     Employer: [employer]     Comment: [employer]   Tobacco Use    Smoking status: Former     Current packs/day: 0.00     Average packs/day: 0.5 packs/day for 15.0 years (7.5 ttl pk-yrs)     Types: Cigarettes     Start date: 1998     Quit date: 2013     Years since quittin.2    Smokeless tobacco: Never   Substance and Sexual Activity    Alcohol use: Yes     Comment: 2-4 beers per week    Drug use: No    Sexual  activity: Yes     Partners: Male     Comment: same relationship since 1988   Other Topics Concern     Service No    Blood Transfusions No    Caffeine Concern Yes    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 Not Asked   Social History Narrative    Lives with  and 6 year daughter.  Has two dogs.     Social Drivers of Health     Financial Resource Strain: Low Risk  (6/17/2024)    Received from [employer]    Financial Resource Strain     Difficulty of Paying Living Expenses: 3     Difficulty of Paying Living Expenses: Not on file   Food Insecurity: No Food Insecurity (6/17/2024)    Received from [employer]    Food Insecurity     Do you worry your food will run out before you are able to buy more?: 1   Transportation Needs: No Transportation Needs (6/17/2024)    Received from [employer]    Transportation Needs     Does lack of transportation keep you from medical appointments?: 1     Does lack of transportation keep you from work, meetings or getting things that you need?: 1   Physical Activity: Not on file   Stress: Not on file   Social Connections: Socially Integrated (6/17/2024)    Received from [employer]    Social Connections     Do you often feel lonely or isolated from those around you?: 0   Interpersonal Safety: Not on file   Housing Stability: Low Risk  (6/17/2024)    Received from [employer]    Housing Stability     What is your housing situation today?: 1       Family  "History:  Family History   Problem Relation Age of Onset    Kidney Cancer Father 59        renal cell carcinoma    Osteoporosis Father         related to cancer    Connective Tissue Disorder Mother         rheumatoid arthritis    Chronic Obstructive Pulmonary Disease Mother         COPD, smoker    Cardiovascular Mother         carotid endarterectomy,peripheral vascular disease, [place] amputation, smoker    Hypertension Mother     Osteoporosis Mother         prednisone, no fractures    Hypertension Brother     Hypertension Maternal Grandfather     Cervical Cancer Maternal Grandmother 30         at 56    Cerebrovascular Disease Paternal Grandfather     Neurologic Disorder Sister         headaches    Breast Cancer Cousin 51        two maternal cousins, diagnosed at 51 and 52       Review of Systems:  A complete review of systems reviewed by me is negative with the exeption of what has been mentioned in the history of present illness.        Physical Examination:  Vitals: /70   Pulse 63   Ht 1.6 m (5' 3\")   Wt 66.7 kg (147 lb)   SpO2 100%   BMI 26.04 kg/m    BMI= Body mass index is 26.04 kg/m .    Neck Cir (cm): 39 cm      GENERAL APPEARANCE: healthy, alert, no distress, and cooperative  EYES: Eyes grossly normal to inspection, PERRL, conjunctivae and sclerae normal, lids and lashes normal, and wearing glasses  HENT: oropharynx crowded and tongue base enlarged  NECK: no adenopathy, no asymmetry, masses, or scars, thyroid normal to palpation, and trachea midline and normal to palpation  RESP: lungs clear to auscultation - no rales, rhonchi or wheezes  CV: regular rates and rhythm, no murmur, click or rub, and no irregular beats  LYMPHATICS: no cervical adenopathy  MS: extremities normal- no gross deformities noted  NEURO: Normal strength and tone, mentation intact, and speech normal  Mallampati Class: IV.  Tonsillar Stage: 1  hidden by pillars.         Data: All pertinent previous laboratory data reviewed " "    Recent Labs   Lab Test 10/18/24  1040 09/29/23  1347    140   POTASSIUM 4.3 4.6   CHLORIDE 108* 106   CO2 24 24   ANIONGAP 10 10   GLC 95 94   BUN 14.2 20.0   CR 0.90 0.92   BOBBI 9.7 9.4       Recent Labs   Lab Test 10/18/24  1040   WBC 7.3   RBC 4.30   HGB 12.6   HCT 40.3   MCV 94   MCH 29.3   MCHC 31.3*   RDW 13.2          Recent Labs   Lab Test 10/18/24  1040   PROTTOTAL 7.0   ALBUMIN 4.4   BILITOTAL 0.4   ALKPHOS 134   AST 18   ALT 13       TSH (mU/L)   Date Value   04/02/2015 0.59   03/13/2013 1.46       No results found for: \"UAMP\", \"UBARB\", \"BENZODIAZEUR\", \"UCANN\", \"UCOC\", \"OPIT\", \"UPCP\"    Iron Saturation Index   Date/Time Value Ref Range Status   03/12/2015 07:35 AM 23 15 - 46 % Final     Ferritin   Date/Time Value Ref Range Status   02/19/2015 09:17  10 - 300 ng/mL Final       No results found for: \"PH\", \"PHARTERIAL\", \"PO2\", \"AN5CVMETJIH\", \"SAT\", \"PCO2\", \"HCO3\", \"BASEEXCESS\", \"LARS\", \"BEB\"    @LABRCNTIPR(phv:4,pco2v:4,po2v:4,hco3v:4,stephanie:4,o2per:4)@    Echocardiology: No results found for this or any previous visit (from the past 4320 hours).    Chest x-ray: No results found for this or any previous visit from the past 365 days.      Chest CT: No results found for this or any previous visit from the past 365 days.      PFT: Most Recent [facility] Pulmonary Function Testing    No results found for: \"20001\"        Bennett Ezra Goltz, PA-C, TIFFANIE 3/11/2025          "

## 2025-03-12 ENCOUNTER — OFFICE VISIT (OUTPATIENT)
Dept: SLEEP MEDICINE | Facility: CLINIC | Age: 55
End: 2025-03-12
Attending: INTERNAL MEDICINE
Payer: COMMERCIAL

## 2025-03-12 VITALS
BODY MASS INDEX: 26.05 KG/M2 | HEART RATE: 63 BPM | SYSTOLIC BLOOD PRESSURE: 107 MMHG | WEIGHT: 147 LBS | DIASTOLIC BLOOD PRESSURE: 70 MMHG | OXYGEN SATURATION: 100 % | HEIGHT: 63 IN

## 2025-03-12 DIAGNOSIS — R06.83 SNORING: ICD-10-CM

## 2025-03-12 DIAGNOSIS — G47.26 SHIFT WORK SLEEP DISORDER: Primary | ICD-10-CM

## 2025-03-12 PROCEDURE — 3078F DIAST BP <80 MM HG: CPT | Performed by: PHYSICIAN ASSISTANT

## 2025-03-12 PROCEDURE — 3074F SYST BP LT 130 MM HG: CPT | Performed by: PHYSICIAN ASSISTANT

## 2025-03-12 PROCEDURE — 99204 OFFICE O/P NEW MOD 45 MIN: CPT | Performed by: PHYSICIAN ASSISTANT

## 2025-03-12 PROCEDURE — 1126F AMNT PAIN NOTED NONE PRSNT: CPT | Performed by: PHYSICIAN ASSISTANT

## 2025-03-12 RX ORDER — FREMANEZUMAB-VFRM 225 MG/1.5ML
INJECTION SUBCUTANEOUS
COMMUNITY
Start: 2024-09-24

## 2025-03-12 RX ORDER — ATORVASTATIN CALCIUM 20 MG/1
20 TABLET, FILM COATED ORAL AT BEDTIME
COMMUNITY

## 2025-03-12 ASSESSMENT — PATIENT HEALTH QUESTIONNAIRE - PHQ9
SUM OF ALL RESPONSES TO PHQ QUESTIONS 1-9: 5
10. IF YOU CHECKED OFF ANY PROBLEMS, HOW DIFFICULT HAVE THESE PROBLEMS MADE IT FOR YOU TO DO YOUR WORK, TAKE CARE OF THINGS AT HOME, OR GET ALONG WITH OTHER PEOPLE: NOT DIFFICULT AT ALL
SUM OF ALL RESPONSES TO PHQ QUESTIONS 1-9: 5

## 2025-03-12 ASSESSMENT — SLEEP AND FATIGUE QUESTIONNAIRES
HOW LIKELY ARE YOU TO NOD OFF OR FALL ASLEEP WHILE SITTING AND READING: SLIGHT CHANCE OF DOZING
HOW LIKELY ARE YOU TO NOD OFF OR FALL ASLEEP WHEN YOU ARE A PASSENGER IN A CAR FOR AN HOUR WITHOUT A BREAK: MODERATE CHANCE OF DOZING
HOW LIKELY ARE YOU TO NOD OFF OR FALL ASLEEP WHILE LYING DOWN TO REST IN THE AFTERNOON WHEN CIRCUMSTANCES PERMIT: HIGH CHANCE OF DOZING
HOW LIKELY ARE YOU TO NOD OFF OR FALL ASLEEP WHILE SITTING QUIETLY AFTER LUNCH WITHOUT ALCOHOL: MODERATE CHANCE OF DOZING
HOW LIKELY ARE YOU TO NOD OFF OR FALL ASLEEP WHILE WATCHING TV: HIGH CHANCE OF DOZING
HOW LIKELY ARE YOU TO NOD OFF OR FALL ASLEEP IN A CAR, WHILE STOPPED FOR A FEW MINUTES IN TRAFFIC: WOULD NEVER DOZE
HOW LIKELY ARE YOU TO NOD OFF OR FALL ASLEEP WHILE SITTING INACTIVE IN A PUBLIC PLACE: WOULD NEVER DOZE
HOW LIKELY ARE YOU TO NOD OFF OR FALL ASLEEP WHILE SITTING AND TALKING TO SOMEONE: WOULD NEVER DOZE

## 2025-03-12 NOTE — NURSING NOTE
"Chief Complaint   Patient presents with    Sleep Problem     Snoring       Initial /70   Pulse 63   Ht 1.6 m (5' 3\")   Wt 66.7 kg (147 lb)   SpO2 100%   BMI 26.04 kg/m   Estimated body mass index is 26.04 kg/m  as calculated from the following:    Height as of this encounter: 1.6 m (5' 3\").    Weight as of this encounter: 66.7 kg (147 lb).    Medication Reconciliation: complete  ESS 11  Neck circumference: 39 centimeters.  Juanis Beebe MA   "

## 2025-03-12 NOTE — PATIENT INSTRUCTIONS
Use the [employer] khalif to record your snoring/breathing in sleep. Send me a few screen shots attached to a MyChart note. Let me know if you find gasps, snorts or pauses in breathing, or variable intensity to the snoring over 4-5 breaths.  If you are still having poor sleep quality, daytime sleepiness, or develop high blood pressure, then we should consider repeating a sleep study.     Instructions for treating Delayed Sleep Phase Syndrome:    Delayed Sleep Phase Syndrome (DSPS) means that your body's internal timing is set late compared to the 24 hour day. Therefore, it is often difficult to get up on time for work in the morning and sometimes difficult to fall asleep on time, in order to get enough sleep. People with DSPS often tend to like to stay up late on weekends and sleep in until between 10 AM and noon, sometimes even later.  This is actually a bad habit that will perpetuate the problem. It reinforces your body's tendency to be on that later schedule. Keeping the same wake time on all days (or as close as possible) will help your body maintain any advancement you make to your circadian rhythms.    You should go to bed when you are sleepy and ready to sleep. During this entire process, you should not engage in activities that may make it worse, such as watching TV in bed, leaving the TV on all night, drinking any caffeine 6 hours before bed or exercising 1-2 hours before bed.     Start taking Melatonin, 0.5-1 mg tablet 3-5 hours before the time that you fall asleep on average (not your desired bedtime or time that you get in bed, but the time you normally fall asleep on your own).     Upon awakening, get exposure to sun-light for about 30-45 minutes. You do not need to look at the sun, in fact, this is dangerous. Reading the paper with the sun shining on you is adequate.  Alternatively, you may use a Seasonal Affective Disorder Lamp (intensity 10,000 Lux) instead of the sun. The lamp should be positioned 1-2  arms lengths away from you. They lamps are sold at Home [employer] Companies such as [employer], [employer] or [employer]. A prescription can be written to get insurance coverage in some cases. They are also sold on [employer]. Consider the [employer] Happy Light [employer].    Using the light and melatonin should help march your internal clock (known as your circadian rhythms) gradually earlier. As your bedtime advances, remember to take your melatonin earlier, keeping it 5 hours before your fall asleep time.    Avoid naps and sleeping in because sleeping during the day will delay your body's clock and you will have to start from scratch.     More information about light therapy:  If the cost of any light box is too much, you can also purchase a compact fluorescent all spectrum light bulb at a local hardware store for about $8.  The most commonly available bulb is 1400 lumen.  You would need two of these positioned within 1 meter of yourself to be equivalent to 2,500 lux.  The bulbs can be placed in a standard light fixture.  Additionally, they can be placed in a mountable fixture that is used in greenhouses.  Mountable fixtures are also available at hardware stores for about $9.  Do not look directly at the light.  If you have any concerns regarding the safety of bright light therapy for you, it is recommended that you consult an ophthalmologist before using a light box.  If you have a condition that makes your eyes very sensitive to light, macular degeneration, a family history of such problems, or diabetic changes to your eyes, consult an ophthalmologist before using a light box. If you have anxiety disorder and have an increase in anxiety discontinue use.